# Patient Record
Sex: MALE | Race: WHITE | NOT HISPANIC OR LATINO | URBAN - METROPOLITAN AREA
[De-identification: names, ages, dates, MRNs, and addresses within clinical notes are randomized per-mention and may not be internally consistent; named-entity substitution may affect disease eponyms.]

---

## 2021-12-24 ENCOUNTER — INPATIENT (INPATIENT)
Facility: HOSPITAL | Age: 56
LOS: 12 days | Discharge: HOME | DRG: 177 | End: 2022-01-06
Attending: HOSPITALIST | Admitting: INTERNAL MEDICINE
Payer: COMMERCIAL

## 2021-12-24 VITALS
HEIGHT: 70 IN | RESPIRATION RATE: 28 BRPM | TEMPERATURE: 100 F | HEART RATE: 112 BPM | DIASTOLIC BLOOD PRESSURE: 77 MMHG | OXYGEN SATURATION: 93 % | SYSTOLIC BLOOD PRESSURE: 121 MMHG

## 2021-12-24 LAB
ALBUMIN SERPL ELPH-MCNC: 3.8 G/DL — SIGNIFICANT CHANGE UP (ref 3.3–5.2)
ALP SERPL-CCNC: 79 U/L — SIGNIFICANT CHANGE UP (ref 40–120)
ALT FLD-CCNC: 74 U/L — HIGH
ANION GAP SERPL CALC-SCNC: 11 MMOL/L — SIGNIFICANT CHANGE UP (ref 5–17)
APTT BLD: 33.8 SEC — SIGNIFICANT CHANGE UP (ref 27.5–35.5)
AST SERPL-CCNC: 178 U/L — HIGH
BASE EXCESS BLDV CALC-SCNC: 5.1 MMOL/L — HIGH (ref -2–3)
BASOPHILS # BLD AUTO: 0.05 K/UL — SIGNIFICANT CHANGE UP (ref 0–0.2)
BASOPHILS NFR BLD AUTO: 0.3 % — SIGNIFICANT CHANGE UP (ref 0–2)
BILIRUB SERPL-MCNC: 0.4 MG/DL — SIGNIFICANT CHANGE UP (ref 0.4–2)
BUN SERPL-MCNC: 3.6 MG/DL — LOW (ref 8–20)
CA-I SERPL-SCNC: 1.13 MMOL/L — LOW (ref 1.15–1.33)
CALCIUM SERPL-MCNC: 8.5 MG/DL — LOW (ref 8.6–10.2)
CHLORIDE BLDV-SCNC: 90 MMOL/L — LOW (ref 98–107)
CHLORIDE SERPL-SCNC: 90 MMOL/L — LOW (ref 98–107)
CO2 SERPL-SCNC: 26 MMOL/L — SIGNIFICANT CHANGE UP (ref 22–29)
CREAT SERPL-MCNC: 0.41 MG/DL — LOW (ref 0.5–1.3)
EOSINOPHIL # BLD AUTO: 0.06 K/UL — SIGNIFICANT CHANGE UP (ref 0–0.5)
EOSINOPHIL NFR BLD AUTO: 0.4 % — SIGNIFICANT CHANGE UP (ref 0–6)
GAS PNL BLDV: 126 MMOL/L — LOW (ref 136–145)
GAS PNL BLDV: SIGNIFICANT CHANGE UP
GLUCOSE BLDV-MCNC: 169 MG/DL — HIGH (ref 70–99)
GLUCOSE SERPL-MCNC: 164 MG/DL — HIGH (ref 70–99)
HCO3 BLDV-SCNC: 30 MMOL/L — HIGH (ref 22–29)
HCT VFR BLD CALC: 33.3 % — LOW (ref 39–50)
HCT VFR BLDA CALC: 35 % — LOW (ref 39–51)
HGB BLD CALC-MCNC: 11.7 G/DL — LOW (ref 12.6–17.4)
HGB BLD-MCNC: 11.4 G/DL — LOW (ref 13–17)
HIV 1 & 2 AB SERPL IA.RAPID: SIGNIFICANT CHANGE UP
IMM GRANULOCYTES NFR BLD AUTO: 0.5 % — SIGNIFICANT CHANGE UP (ref 0–1.5)
INR BLD: 1.19 RATIO — HIGH (ref 0.88–1.16)
LACTATE BLDV-MCNC: 1.1 MMOL/L — SIGNIFICANT CHANGE UP (ref 0.5–2)
LYMPHOCYTES # BLD AUTO: 0.84 K/UL — LOW (ref 1–3.3)
LYMPHOCYTES # BLD AUTO: 4.9 % — LOW (ref 13–44)
MCHC RBC-ENTMCNC: 28.8 PG — SIGNIFICANT CHANGE UP (ref 27–34)
MCHC RBC-ENTMCNC: 34.2 GM/DL — SIGNIFICANT CHANGE UP (ref 32–36)
MCV RBC AUTO: 84.1 FL — SIGNIFICANT CHANGE UP (ref 80–100)
MONOCYTES # BLD AUTO: 1.3 K/UL — HIGH (ref 0–0.9)
MONOCYTES NFR BLD AUTO: 7.6 % — SIGNIFICANT CHANGE UP (ref 2–14)
NEUTROPHILS # BLD AUTO: 14.73 K/UL — HIGH (ref 1.8–7.4)
NEUTROPHILS NFR BLD AUTO: 86.3 % — HIGH (ref 43–77)
NT-PROBNP SERPL-SCNC: 87 PG/ML — SIGNIFICANT CHANGE UP (ref 0–300)
PCO2 BLDV: 51 MMHG — SIGNIFICANT CHANGE UP (ref 42–55)
PH BLDV: 7.38 — SIGNIFICANT CHANGE UP (ref 7.32–7.43)
PLATELET # BLD AUTO: 208 K/UL — SIGNIFICANT CHANGE UP (ref 150–400)
PO2 BLDV: 93 MMHG — HIGH (ref 25–45)
POTASSIUM BLDV-SCNC: 3.1 MMOL/L — LOW (ref 3.5–5.1)
POTASSIUM SERPL-MCNC: 3.2 MMOL/L — LOW (ref 3.5–5.3)
POTASSIUM SERPL-SCNC: 3.2 MMOL/L — LOW (ref 3.5–5.3)
PROT SERPL-MCNC: 6.7 G/DL — SIGNIFICANT CHANGE UP (ref 6.6–8.7)
PROTHROM AB SERPL-ACNC: 13.7 SEC — HIGH (ref 10.6–13.6)
RBC # BLD: 3.96 M/UL — LOW (ref 4.2–5.8)
RBC # FLD: 13.8 % — SIGNIFICANT CHANGE UP (ref 10.3–14.5)
SAO2 % BLDV: 98.8 % — SIGNIFICANT CHANGE UP
SODIUM SERPL-SCNC: 127 MMOL/L — LOW (ref 135–145)
WBC # BLD: 17.07 K/UL — HIGH (ref 3.8–10.5)
WBC # FLD AUTO: 17.07 K/UL — HIGH (ref 3.8–10.5)

## 2021-12-24 PROCEDURE — 71045 X-RAY EXAM CHEST 1 VIEW: CPT | Mod: 26

## 2021-12-24 PROCEDURE — 99285 EMERGENCY DEPT VISIT HI MDM: CPT

## 2021-12-24 RX ORDER — POTASSIUM CHLORIDE 20 MEQ
40 PACKET (EA) ORAL ONCE
Refills: 0 | Status: COMPLETED | OUTPATIENT
Start: 2021-12-24 | End: 2021-12-24

## 2021-12-24 RX ORDER — FUROSEMIDE 40 MG
40 TABLET ORAL ONCE
Refills: 0 | Status: COMPLETED | OUTPATIENT
Start: 2021-12-24 | End: 2021-12-24

## 2021-12-24 RX ORDER — ACETAMINOPHEN 500 MG
650 TABLET ORAL ONCE
Refills: 0 | Status: COMPLETED | OUTPATIENT
Start: 2021-12-24 | End: 2021-12-24

## 2021-12-24 RX ADMIN — Medication 650 MILLIGRAM(S): at 22:44

## 2021-12-24 RX ADMIN — Medication 40 MILLIGRAM(S): at 23:56

## 2021-12-24 NOTE — ED PROVIDER NOTE - PHYSICAL EXAMINATION
General: chronically ill appearing man on non-rebreather, in no acute respiratory distress  Head: normocephalic, atraumatic  Eyes: PERRL   Mouth: moist mucous membranes  Neck: supple neck, no lymphadenopathy  CV: tachycardic, regular rhythm , no LE edema, peripheral pulses 2+ bilateral UE and LE, + bilateral lower extremity edema   Respiratory: stating 93% on 15L non-rebreather crackles bilateral   Abdomen: soft, nontender, nondistended  : no suprapubic tenderness, no CVAT  MSK: no joint deformities  Neuro: alert and oriented x3, speech clear, moving all extremities spontaneously without difficulty  Skin: 2 cm wound on medial ankle with minimal surrounding erythema, no purulent, drainage or streaking redness. General: chronically ill appearing man on non-rebreather, in no acute respiratory distress  Head: normocephalic, atraumatic  Eyes: PERRL   Mouth: moist mucous membranes  Neck: supple neck, no lymphadenopathy  CV: tachycardic, regular rhythm, peripheral pulses 2+ bilateral UE and LE, + bilateral lower extremity edema   Respiratory: sp02 93% on 15L non-rebreather crackles bilateral   Abdomen: soft, nontender, nondistended  : no suprapubic tenderness, no CVAT  MSK: no joint deformities  Neuro: alert and oriented x3, speech clear, moving all extremities spontaneously without difficulty  Skin: 2 cm wound on medial ankle with minimal surrounding erythema, no purulent, drainage or streaking redness.

## 2021-12-24 NOTE — ED PROVIDER NOTE - OBJECTIVE STATEMENT
57 y/o male with PMHx of schizophrenia  BIBEMS from [place] c/o SOB and wound to left foot, Pt states wound on foot started yesterday. Pt states he is always SOB. Pt endorses cough. Pt denies chest pain, leg pain. Pt is poor historian and is unable to contribute further hx secondary to psychiatric illness. 55 y/o male with PMHx of schizophrenia, HTN, DM, BIBEMS from [facility] c/o SOB and wound to right foot, Pt states wound on foot started yesterday. Pt states he is always SOB. Pt endorses cough. Pt denies chest pain, leg pain. Pt is poor historian and is unable to contribute further hx secondary to psychiatric illness.

## 2021-12-24 NOTE — ED PROVIDER NOTE - PROGRESS NOTE DETAILS
MD Yuval: spoke with RONALD Tam at [facility] who was sent over for evaluation of wound on right ankle, unclear how long it has been there. They also noted that he was more lethargic and had more slurred speech than usual. He also seemed like he had more difficulty breathing but unclear if it is his baseline as he does not usually take care of him. MD Yuval: CT reveals lesion concerning for malignancy, discussed results with pt who is able to verbalize diagnosis but unclear if pt has clear understanding. pt also noted to have changes consistent with infection/pna and started on abx. will admit for further evaluation of nodule and respiratory support for hypoxia in setting of pna.

## 2021-12-24 NOTE — ED ADULT TRIAGE NOTE - SOURCE OF INFORMATION
Last Labs:  5/22/2017  Last OV:  5/24/2017  Last RF:  5/24/2017    Future Appointments  Date Time Provider Kamran Mcdonnell   8/24/2017 8:15 AM REF [facility] Ref [facility]c     Nevaeh Love, 08/21/17, 7:02 AM
Patient

## 2021-12-24 NOTE — ED PROVIDER NOTE - NS ED ROS FT
General: no fevers  Head: no headaches  Eyes: no vision change  ENT: no nasal discharge/congestion, no sore throat, no ear pain  CV: no chest pain  Resp: + SOB, + cough  GI: no N/V/D, no abdominal pain, no blood in stool  : no dysuria, no hematuria   MSK: no joint pain, no muscle aches, no neck pain or back pain  Skin:  + wound to left foot  Neuro: no focal weakness, no change in sensation General: no fever  Head: no headaches  Eyes: no vision change  ENT: no nasal discharge/congestion  CV: no chest pain  Resp: + chronic SOB, + cough  GI: no N/V/D, no abdominal pain, no blood in stool  : no dysuria  MSK: no joint pain  Skin:  + wound to left foot  Neuro: no focal weakness, no change in sensation

## 2021-12-24 NOTE — ED ADULT NURSE NOTE - OBJECTIVE STATEMENT
Pt BIBA for SOB and lesions on R foot. Pt's speech is incoherent. Pt presents SOB and wheezing. R foot has lesions by medial ankle; unknown when lesions started. Pt moved to PEDS 1 to R/O COVID. Pt placed on telemonitor with SpO2 monitoring. IV access obtained. Specimens sent to lab. Will continue to monitor. Pt BIBA for SOB and lesions on R foot. Pt's speech is incoherent. Pt presents SOB and wheezing. R foot has lesions by medial ankle; unknown when lesions started. Pt moved to PEDS 1 to R/O COVID.  Pt placed on monitor with SpO2 monitoring. IV access obtained. Specimens sent to lab. Will continue to monitor.

## 2021-12-24 NOTE — ED ADULT TRIAGE NOTE - CHIEF COMPLAINT QUOTE
BIBEMS from [facility] for SOB and infection to R foot. Vaccinated. Denies sick contacts, borderline febrile. R foot is red, swollen, dry. Patient is a poor historian.

## 2021-12-24 NOTE — ED PROVIDER NOTE - CLINICAL SUMMARY MEDICAL DECISION MAKING FREE TEXT BOX
57yo man presents and provides poor hx and complains of chronic SOB and right ankle injury that he states he had since yesterday. No fevers but has low grade oral temp. Will consider PNA vs PE vs CHF vs acs vs viral infx. Will obtain blood work, CTA chest, CTH, cxr. Will require admission for respiratory support. 57yo man presents and provides poor hx and complains of chronic SOB and right ankle injury that he states he had since yesterday. No fevers but has low grade oral temp. No cellulitic changes around ankle wound. Will consider PNA vs PE vs CHF vs acs vs viral infx. Will obtain blood work, CTA chest, CTH, cxr. Will require admission for respiratory support.

## 2021-12-24 NOTE — ED ADULT NURSE NOTE - CHIEF COMPLAINT QUOTE
BIBEMS from [facility] for SOB and infection to R foot. Vaccinated. Denies sick contacts, borderline febrile. R foot is red, swollen, dry. Patient is a poor historian.

## 2021-12-25 DIAGNOSIS — J18.9 PNEUMONIA, UNSPECIFIED ORGANISM: ICD-10-CM

## 2021-12-25 LAB
ANION GAP SERPL CALC-SCNC: 12 MMOL/L — SIGNIFICANT CHANGE UP (ref 5–17)
APPEARANCE UR: CLEAR — SIGNIFICANT CHANGE UP
BILIRUB UR-MCNC: NEGATIVE — SIGNIFICANT CHANGE UP
BUN SERPL-MCNC: <3 MG/DL — LOW (ref 8–20)
CALCIUM SERPL-MCNC: 9.1 MG/DL — SIGNIFICANT CHANGE UP (ref 8.6–10.2)
CHLORIDE SERPL-SCNC: 94 MMOL/L — LOW (ref 98–107)
CO2 SERPL-SCNC: 29 MMOL/L — SIGNIFICANT CHANGE UP (ref 22–29)
COLOR SPEC: YELLOW — SIGNIFICANT CHANGE UP
CREAT SERPL-MCNC: 0.41 MG/DL — LOW (ref 0.5–1.3)
DIFF PNL FLD: NEGATIVE — SIGNIFICANT CHANGE UP
GLUCOSE SERPL-MCNC: 168 MG/DL — HIGH (ref 70–99)
GLUCOSE UR QL: 50 MG/DL
KETONES UR-MCNC: ABNORMAL
LEUKOCYTE ESTERASE UR-ACNC: NEGATIVE — SIGNIFICANT CHANGE UP
NITRITE UR-MCNC: NEGATIVE — SIGNIFICANT CHANGE UP
PH UR: 6.5 — SIGNIFICANT CHANGE UP (ref 5–8)
POTASSIUM SERPL-MCNC: 3.4 MMOL/L — LOW (ref 3.5–5.3)
POTASSIUM SERPL-SCNC: 3.4 MMOL/L — LOW (ref 3.5–5.3)
PROT UR-MCNC: NEGATIVE — SIGNIFICANT CHANGE UP
RAPID RVP RESULT: SIGNIFICANT CHANGE UP
SARS-COV-2 RNA SPEC QL NAA+PROBE: SIGNIFICANT CHANGE UP
SODIUM SERPL-SCNC: 134 MMOL/L — LOW (ref 135–145)
SP GR SPEC: 1.01 — SIGNIFICANT CHANGE UP (ref 1.01–1.02)
TROPONIN T SERPL-MCNC: <0.01 NG/ML — SIGNIFICANT CHANGE UP (ref 0–0.06)
UROBILINOGEN FLD QL: 1 MG/DL

## 2021-12-25 PROCEDURE — 99223 1ST HOSP IP/OBS HIGH 75: CPT

## 2021-12-25 PROCEDURE — 70450 CT HEAD/BRAIN W/O DYE: CPT | Mod: 26,MA

## 2021-12-25 PROCEDURE — 71275 CT ANGIOGRAPHY CHEST: CPT | Mod: 26,MA

## 2021-12-25 RX ORDER — INSULIN DETEMIR 100/ML (3)
30 INSULIN PEN (ML) SUBCUTANEOUS
Qty: 0 | Refills: 0 | DISCHARGE

## 2021-12-25 RX ORDER — POTASSIUM CHLORIDE 20 MEQ
40 PACKET (EA) ORAL EVERY 4 HOURS
Refills: 0 | Status: COMPLETED | OUTPATIENT
Start: 2021-12-25 | End: 2021-12-25

## 2021-12-25 RX ORDER — CLOZAPINE 150 MG/1
25 TABLET, ORALLY DISINTEGRATING ORAL AT BEDTIME
Refills: 0 | Status: DISCONTINUED | OUTPATIENT
Start: 2021-12-25 | End: 2022-01-06

## 2021-12-25 RX ORDER — LANOLIN ALCOHOL/MO/W.PET/CERES
3 CREAM (GRAM) TOPICAL AT BEDTIME
Refills: 0 | Status: DISCONTINUED | OUTPATIENT
Start: 2021-12-25 | End: 2022-01-06

## 2021-12-25 RX ORDER — LOSARTAN POTASSIUM 100 MG/1
1 TABLET, FILM COATED ORAL
Qty: 0 | Refills: 0 | DISCHARGE

## 2021-12-25 RX ORDER — LOSARTAN POTASSIUM 100 MG/1
100 TABLET, FILM COATED ORAL AT BEDTIME
Refills: 0 | Status: DISCONTINUED | OUTPATIENT
Start: 2021-12-25 | End: 2022-01-06

## 2021-12-25 RX ORDER — VALPROIC ACID (AS SODIUM SALT) 250 MG/5ML
250 SOLUTION, ORAL ORAL AT BEDTIME
Refills: 0 | Status: DISCONTINUED | OUTPATIENT
Start: 2021-12-25 | End: 2021-12-28

## 2021-12-25 RX ORDER — CEFTRIAXONE 500 MG/1
1000 INJECTION, POWDER, FOR SOLUTION INTRAMUSCULAR; INTRAVENOUS EVERY 24 HOURS
Refills: 0 | Status: DISCONTINUED | OUTPATIENT
Start: 2021-12-25 | End: 2021-12-26

## 2021-12-25 RX ORDER — HALOPERIDOL DECANOATE 100 MG/ML
100 INJECTION INTRAMUSCULAR
Qty: 0 | Refills: 0 | DISCHARGE

## 2021-12-25 RX ORDER — POTASSIUM CHLORIDE 20 MEQ
40 PACKET (EA) ORAL EVERY 4 HOURS
Refills: 0 | Status: DISCONTINUED | OUTPATIENT
Start: 2021-12-25 | End: 2021-12-25

## 2021-12-25 RX ORDER — SODIUM CHLORIDE 9 MG/ML
1000 INJECTION, SOLUTION INTRAVENOUS
Refills: 0 | Status: DISCONTINUED | OUTPATIENT
Start: 2021-12-25 | End: 2021-12-26

## 2021-12-25 RX ORDER — CLOZAPINE 150 MG/1
1 TABLET, ORALLY DISINTEGRATING ORAL
Qty: 0 | Refills: 0 | DISCHARGE

## 2021-12-25 RX ORDER — ENOXAPARIN SODIUM 100 MG/ML
40 INJECTION SUBCUTANEOUS DAILY
Refills: 0 | Status: DISCONTINUED | OUTPATIENT
Start: 2021-12-25 | End: 2022-01-02

## 2021-12-25 RX ORDER — IPRATROPIUM/ALBUTEROL SULFATE 18-103MCG
2 AEROSOL WITH ADAPTER (GRAM) INHALATION
Qty: 0 | Refills: 0 | DISCHARGE

## 2021-12-25 RX ORDER — VALPROIC ACID (AS SODIUM SALT) 250 MG/5ML
20 SOLUTION, ORAL ORAL
Qty: 0 | Refills: 0 | DISCHARGE

## 2021-12-25 RX ORDER — AZITHROMYCIN 500 MG/1
500 TABLET, FILM COATED ORAL ONCE
Refills: 0 | Status: COMPLETED | OUTPATIENT
Start: 2021-12-25 | End: 2021-12-25

## 2021-12-25 RX ORDER — CLOZAPINE 150 MG/1
200 TABLET, ORALLY DISINTEGRATING ORAL AT BEDTIME
Refills: 0 | Status: DISCONTINUED | OUTPATIENT
Start: 2021-12-25 | End: 2022-01-06

## 2021-12-25 RX ORDER — IPRATROPIUM/ALBUTEROL SULFATE 18-103MCG
2 AEROSOL WITH ADAPTER (GRAM) INHALATION
Refills: 0 | Status: DISCONTINUED | OUTPATIENT
Start: 2021-12-25 | End: 2021-12-29

## 2021-12-25 RX ORDER — AZITHROMYCIN 500 MG/1
500 TABLET, FILM COATED ORAL DAILY
Refills: 0 | Status: DISCONTINUED | OUTPATIENT
Start: 2021-12-25 | End: 2021-12-26

## 2021-12-25 RX ORDER — CEFTRIAXONE 500 MG/1
1000 INJECTION, POWDER, FOR SOLUTION INTRAMUSCULAR; INTRAVENOUS ONCE
Refills: 0 | Status: COMPLETED | OUTPATIENT
Start: 2021-12-25 | End: 2021-12-25

## 2021-12-25 RX ORDER — HALOPERIDOL DECANOATE 100 MG/ML
5 INJECTION INTRAMUSCULAR ONCE
Refills: 0 | Status: COMPLETED | OUTPATIENT
Start: 2021-12-25 | End: 2021-12-25

## 2021-12-25 RX ADMIN — HALOPERIDOL DECANOATE 5 MILLIGRAM(S): 100 INJECTION INTRAMUSCULAR at 04:20

## 2021-12-25 RX ADMIN — CEFTRIAXONE 100 MILLIGRAM(S): 500 INJECTION, POWDER, FOR SOLUTION INTRAMUSCULAR; INTRAVENOUS at 06:27

## 2021-12-25 RX ADMIN — Medication 2 MILLIGRAM(S): at 04:20

## 2021-12-25 RX ADMIN — CLOZAPINE 25 MILLIGRAM(S): 150 TABLET, ORALLY DISINTEGRATING ORAL at 21:35

## 2021-12-25 RX ADMIN — CLOZAPINE 200 MILLIGRAM(S): 150 TABLET, ORALLY DISINTEGRATING ORAL at 21:36

## 2021-12-25 RX ADMIN — Medication 650 MILLIGRAM(S): at 03:15

## 2021-12-25 NOTE — ED ADULT NURSE REASSESSMENT NOTE - NS ED NURSE REASSESS COMMENT FT1
Patient endorsed from RONALD Ahn. Care resumed at this time. Patient endorsed from RONALD Ahn. RN endorsed patient pulled out IV line. Ordered rocephin was not completed. Care resumed at this time. Patient endorsed from RN Shivam Ahn. RN endorsed patient pulled out IV line. Ordered rocephin was not completed as per RN. Patient refusing to be placed back on monitor and a new med lock. MD made aware. Care resumed at this time.

## 2021-12-25 NOTE — ED ADULT NURSE REASSESSMENT NOTE - NS ED NURSE REASSESS COMMENT FT1
Pt refusing BP and temperature; Dr. Pimentel made aware of vital signs. Bed locked and in lowest position. Will continue to monitor.

## 2021-12-25 NOTE — H&P ADULT - HISTORY OF PRESENT ILLNESS
56 yr old male from [facility] on the [place] [facility] with medical history of Schizo, DM, Obesity, HTN was sent in for right foot wound, which he states was noted yesterday. He does not recall trauma. However in ED, the wound appears > 1 day old and incidentally noted pulm infiltrates and nodule on imaging. Denies fevers, dyspnea, cough. States his wheezing/ congestion is regular and he takes inhalers.     SH- per records, no habits  FH- none available

## 2021-12-25 NOTE — H&P ADULT - NSHPPHYSICALEXAM_GEN_ALL_CORE
Vital Signs Last 24 Hrs  T(C): 37.1 (12-25-21 @ 06:31), Max: 37.9 (12-24-21 @ 21:18)  T(F): 98.8 (12-25-21 @ 06:31), Max: 100.2 (12-24-21 @ 21:18)  HR: 91 (12-25-21 @ 06:31) (91 - 112)  BP: 159/74 (12-25-21 @ 06:31) (121/77 - 159/74)  BP(mean): --  RR: 28 (12-25-21 @ 06:31) (28 - 28)  SpO2: 93% (12-25-21 @ 06:31) (92% - 93%)    GENERAL: Morbid Obesity, sitting in chair, with congested cough +, no dyspnea  HEAD:  Atraumatic, Normocephalic  EYES: conjunctiva and sclera clear  NECK: Supple, No JVD, Normal thyroid  NERVOUS SYSTEM:  Alert & Oriented X 3, Motor Strength 5/5 B/L upper and lower extremities  CHEST/LUNG: CTA bilaterally; No rales, rhonchi, wheezing, or rubs  HEART: Regular rate and rhythm; No murmurs, rubs, or gallops  ABDOMEN: Obese, Soft, Nontender, Nondistended; Bowel sounds present  EXTREMITIES:  2+ Peripheral Pulses, No clubbing, cyanosis, or edema  RIGHT FOOT- inner ankle- 2 areas of abrasion with healing scab formation, minimal edema surrounding, no erythema, no tenderness, no discharge

## 2021-12-25 NOTE — H&P ADULT - ASSESSMENT
56 yr old male from [facility] on the [place] [facility] with medical history of Schizo, DM, Obesity, HTN was sent in for right foot wound, with incidental finding of PNA    # GGOs, tree in the bud opacities on imaging= CAP  possible post obstructive  Blood c/s sent from ED  Rocephin, Zithromax  If no improvement will escalate  Cont maintenance Combivent inhaler  Mucinex  COVID negative, vaccinated    # Acute hypoxic respiratory failure  requiring 6 L NC  unable to tolerate NRB  etio- sec to above    # 3.9 x 2.8 cm nodular right upper lobe opacity on imaging= highly suspicious for malignancy  Need further details about chronicity, acceptance of medical intervention  SW consult requested to get info from his facility about NOK, HCP etc     # Schizophrenia  requiring repeated redirection/ reorientation  keeps wandering pulling off O2  Cont Valproate/ Clozaril    # DM2 uncontrolled  high HgbA1c  Lantus, premeal, ISS here    # HTN controlled  Cont Cozaar    # Right foot wound  with healing scab beginning to form  wound care ordered    Lovenox       56 yr old male from [facility] on the [place] [facility] with medical history of Schizo, DM, Obesity, HTN was sent in for right foot wound, with incidental finding of PNA    # GGOs, tree in the bud opacities on imaging, leucocytosis= CAP  possible post obstructive  Blood c/s sent from ED  Rocephin, Zithromax  If no improvement will escalate  Cont maintenance Combivent inhaler  Mucinex  COVID negative, vaccinated    # Acute hypoxic respiratory failure  requiring 6 L NC  unable to tolerate NRB  etio- sec to above    # 3.9 x 2.8 cm nodular right upper lobe opacity on imaging= highly suspicious for malignancy  Need further details about chronicity, acceptance of medical intervention  SW consult requested to get info from his facility about NOK, HCP etc     # Hyponatremia  likely chronic sec to psych meds  no dietary salt restrictions  if persists or worsens will add fluid restriction    # Hypokalemia  replete    # UA with ketones= dehydration  24 hr IVF    # Schizophrenia  requiring repeated redirection/ reorientation  keeps wandering pulling off O2  Cont Valproate/ Clozaril    # DM2 uncontrolled  high HgbA1c  Lantus, premeal, ISS here    # HTN controlled  Cont Cozaar    # Right foot wound  with healing scab beginning to form  wound care ordered    Lovenox

## 2021-12-25 NOTE — ED ADULT NURSE REASSESSMENT NOTE - NS ED NURSE REASSESS COMMENT FT1
Pt placed back on telemonitor. IV antibiotics running. Bed locked and in lowest position. Frequent checks made. Will continue to monitor.

## 2021-12-26 LAB
ALBUMIN SERPL ELPH-MCNC: 3.9 G/DL — SIGNIFICANT CHANGE UP (ref 3.3–5.2)
ALP SERPL-CCNC: 84 U/L — SIGNIFICANT CHANGE UP (ref 40–120)
ALT FLD-CCNC: 75 U/L — HIGH
ANION GAP SERPL CALC-SCNC: 11 MMOL/L — SIGNIFICANT CHANGE UP (ref 5–17)
AST SERPL-CCNC: 106 U/L — HIGH
BASOPHILS # BLD AUTO: 0.06 K/UL — SIGNIFICANT CHANGE UP (ref 0–0.2)
BASOPHILS NFR BLD AUTO: 0.5 % — SIGNIFICANT CHANGE UP (ref 0–2)
BILIRUB SERPL-MCNC: 0.5 MG/DL — SIGNIFICANT CHANGE UP (ref 0.4–2)
BUN SERPL-MCNC: 4.5 MG/DL — LOW (ref 8–20)
CALCIUM SERPL-MCNC: 9 MG/DL — SIGNIFICANT CHANGE UP (ref 8.6–10.2)
CHLORIDE SERPL-SCNC: 95 MMOL/L — LOW (ref 98–107)
CO2 SERPL-SCNC: 30 MMOL/L — HIGH (ref 22–29)
CREAT SERPL-MCNC: 0.5 MG/DL — SIGNIFICANT CHANGE UP (ref 0.5–1.3)
CULTURE RESULTS: SIGNIFICANT CHANGE UP
EOSINOPHIL # BLD AUTO: 0.21 K/UL — SIGNIFICANT CHANGE UP (ref 0–0.5)
EOSINOPHIL NFR BLD AUTO: 1.8 % — SIGNIFICANT CHANGE UP (ref 0–6)
GLUCOSE SERPL-MCNC: 129 MG/DL — HIGH (ref 70–99)
HCT VFR BLD CALC: 35.3 % — LOW (ref 39–50)
HGB BLD-MCNC: 11.6 G/DL — LOW (ref 13–17)
IMM GRANULOCYTES NFR BLD AUTO: 0.3 % — SIGNIFICANT CHANGE UP (ref 0–1.5)
LYMPHOCYTES # BLD AUTO: 1.64 K/UL — SIGNIFICANT CHANGE UP (ref 1–3.3)
LYMPHOCYTES # BLD AUTO: 13.9 % — SIGNIFICANT CHANGE UP (ref 13–44)
MAGNESIUM SERPL-MCNC: 2.2 MG/DL — SIGNIFICANT CHANGE UP (ref 1.6–2.6)
MCHC RBC-ENTMCNC: 28.1 PG — SIGNIFICANT CHANGE UP (ref 27–34)
MCHC RBC-ENTMCNC: 32.9 GM/DL — SIGNIFICANT CHANGE UP (ref 32–36)
MCV RBC AUTO: 85.5 FL — SIGNIFICANT CHANGE UP (ref 80–100)
MONOCYTES # BLD AUTO: 1.15 K/UL — HIGH (ref 0–0.9)
MONOCYTES NFR BLD AUTO: 9.8 % — SIGNIFICANT CHANGE UP (ref 2–14)
NEUTROPHILS # BLD AUTO: 8.69 K/UL — HIGH (ref 1.8–7.4)
NEUTROPHILS NFR BLD AUTO: 73.7 % — SIGNIFICANT CHANGE UP (ref 43–77)
PLATELET # BLD AUTO: 228 K/UL — SIGNIFICANT CHANGE UP (ref 150–400)
POTASSIUM SERPL-MCNC: 3.3 MMOL/L — LOW (ref 3.5–5.3)
POTASSIUM SERPL-SCNC: 3.3 MMOL/L — LOW (ref 3.5–5.3)
PROT SERPL-MCNC: 7.2 G/DL — SIGNIFICANT CHANGE UP (ref 6.6–8.7)
RBC # BLD: 4.13 M/UL — LOW (ref 4.2–5.8)
RBC # FLD: 14 % — SIGNIFICANT CHANGE UP (ref 10.3–14.5)
SODIUM SERPL-SCNC: 136 MMOL/L — SIGNIFICANT CHANGE UP (ref 135–145)
SPECIMEN SOURCE: SIGNIFICANT CHANGE UP
VALPROATE SERPL-MCNC: 14.2 UG/ML — LOW (ref 50–100)
WBC # BLD: 11.79 K/UL — HIGH (ref 3.8–10.5)
WBC # FLD AUTO: 11.79 K/UL — HIGH (ref 3.8–10.5)

## 2021-12-26 PROCEDURE — 99233 SBSQ HOSP IP/OBS HIGH 50: CPT

## 2021-12-26 RX ORDER — POTASSIUM CHLORIDE 20 MEQ
10 PACKET (EA) ORAL ONCE
Refills: 0 | Status: DISCONTINUED | OUTPATIENT
Start: 2021-12-26 | End: 2021-12-26

## 2021-12-26 RX ORDER — POTASSIUM CHLORIDE 20 MEQ
40 PACKET (EA) ORAL ONCE
Refills: 0 | Status: COMPLETED | OUTPATIENT
Start: 2021-12-26 | End: 2021-12-26

## 2021-12-26 RX ADMIN — Medication 600 MILLIGRAM(S): at 17:08

## 2021-12-26 RX ADMIN — CLOZAPINE 25 MILLIGRAM(S): 150 TABLET, ORALLY DISINTEGRATING ORAL at 21:54

## 2021-12-26 RX ADMIN — Medication 2 PUFF(S): at 08:00

## 2021-12-26 RX ADMIN — LOSARTAN POTASSIUM 100 MILLIGRAM(S): 100 TABLET, FILM COATED ORAL at 21:54

## 2021-12-26 RX ADMIN — CLOZAPINE 200 MILLIGRAM(S): 150 TABLET, ORALLY DISINTEGRATING ORAL at 21:54

## 2021-12-26 NOTE — PROGRESS NOTE ADULT - ASSESSMENT
56 yr old [facility] from [facility] [place] [facility] with medical history of Schizo, DM, Obesity, HTN was sent in for right foot wound, with incidental finding of PNA    # GGOs, tree in the bud opacities on imaging, leucocytosis= CAP  possible post obstructive  Blood c/s sent from ED  refusing IV placement, will change abx to Levaquin   If no improvement will escalate, will consult ID  Cont maintenance Combivent inhaler  Mucinex  COVID negative, vaccinated    # Acute hypoxic respiratory failure  -Not tolerating NC, occasionally ripping them off  -etio- sec to above    # 3.9 x 2.8 cm nodular right upper lobe opacity on imaging= highly suspicious for malignancy  -Need further details about chronicity, acceptance of medical intervention  -SW consult requested to get info from his facility about NOK, HCP etc   -If no HCP, will obtain ethics consult    # Hyponatremia  -likely chronic sec to psych meds, improving after IVF  -daily BMP    # Schizophrenia  -keeps wandering pulling off O2  -Cont Valproate/ Clozaril  -psych consulted, emilio recs    # DM2 uncontrolled  -high HgbA1c  -Lantus, premeal, ISS here    # HTN controlled  Cont Cozaar    # Right foot wound  -with healing scab beginning to form  -wound care consulted, emilio recs    Lovenox    Dispo: pending wound care, weaning off NC and PNA treatment    Social work eval pending for establishing of HCP

## 2021-12-26 NOTE — PROGRESS NOTE ADULT - SUBJECTIVE AND OBJECTIVE BOX
[facility] Division of Hospital Medicine Progress Note    CONTACT INFO:  Garland Pavon M.D.  [phone]    Patient is a 56y old  Male who presents with a chief complaint of foot wound (25 Dec 2021 11:59)      SUBJECTIVE / OVERNIGHT EVENTS: Reports feels fine. Ripped out his IV and refuse IV placement for now. Patient denies chest pain, SOB, abd pain, N/V, fever, chills, dysuria or any other complaints. All remainder ROS negative.     MEDICATIONS  (STANDING):  albuterol/ipratropium (CFC free) Inhaler. 2 Puff(s) Inhalation four times a day  cloZAPine 200 milliGRAM(s) Oral at bedtime  cloZAPine 25 milliGRAM(s) Oral at bedtime  enoxaparin Injectable 40 milliGRAM(s) SubCutaneous daily  guaiFENesin  milliGRAM(s) Oral every 12 hours  lactated ringers. 1000 milliLiter(s) (100 mL/Hr) IV Continuous <Continuous>  levoFLOXacin  Tablet 750 milliGRAM(s) Oral every 24 hours  losartan 100 milliGRAM(s) Oral at bedtime  valproic  acid Syrup 250 milliGRAM(s) Oral at bedtime    MEDICATIONS  (PRN):  melatonin 3 milliGRAM(s) Oral at bedtime PRN Sleep      I&O's Summary      PHYSICAL EXAM:  Vital Signs Last 24 Hrs  T(C): 37.1 (26 Dec 2021 07:39), Max: 37.1 (25 Dec 2021 13:17)  T(F): 98.8 (26 Dec 2021 07:39), Max: 98.8 (25 Dec 2021 19:00)  HR: 97 (26 Dec 2021 07:39) (91 - 98)  BP: 137/81 (26 Dec 2021 07:39) (109/70 - 147/91)  BP(mean): --  RR: 28 (26 Dec 2021 07:39) (28 - 29)  SpO2: 94% (26 Dec 2021 07:39) (92% - 99%)      GENERAL: Morbid Obesity, sitting in bed, no NC  HEAD:  Atraumatic, Normocephalic  EYES: conjunctiva and sclera clear  NECK: Supple, No JVD, Normal thyroid  NERVOUS SYSTEM: Motor Strength 5/5 B/L upper and lower extremities  CHEST/LUNG: CTA bilaterally; No rales, rhonchi, wheezing, or rubs  HEART: Regular rate and rhythm; No murmurs, rubs, or gallops. + LE edema b/l  ABDOMEN: Obese, Soft, Nontender, Nondistended; Bowel sounds present  EXTREMITIES:  2+ Peripheral Pulses, No clubbing, cyanosis, or edema  RIGHT FOOT- inner ankle- 2 areas of abrasion with healing scab formation, minimal erythema, no tenderness, no discharge    LABS:                        11.4   17.07 )-----------( 208      ( 24 Dec 2021 22:33 )             33.3     12-    134<L>  |  94<L>  |  <3.0<L>  ----------------------------<  168<H>  3.4<L>   |  29.0  |  0.41<L>    Ca    9.1      25 Dec 2021 05:08    TPro  6.7  /  Alb  3.8  /  TBili  0.4  /  DBili  x   /  AST  178<H>  /  ALT  74<H>  /  AlkPhos  79  12-24    PT/INR - ( 24 Dec 2021 22:33 )   PT: 13.7 sec;   INR: 1.19 ratio         PTT - ( 24 Dec 2021 22:33 )  PTT:33.8 sec  CARDIAC MARKERS ( 25 Dec 2021 05:08 )  x     / <0.01 ng/mL / x     / x     / x      CARDIAC MARKERS ( 24 Dec 2021 22:33 )  x     / <0.01 ng/mL / x     / x     / x          Urinalysis Basic - ( 25 Dec 2021 00:11 )    Color: Yellow / Appearance: Clear / S.010 / pH: x  Gluc: x / Ketone: Small  / Bili: Negative / Urobili: 1 mg/dL   Blood: x / Protein: Negative / Nitrite: Negative   Leuk Esterase: Negative / RBC: x / WBC x   Sq Epi: x / Non Sq Epi: x / Bacteria: x        CAPILLARY BLOOD GLUCOSE    RADIOLOGY & ADDITIONAL TESTS:  Results Reviewed:   Imaging Personally Reviewed:  Electrocardiogram Personally Reviewed:

## 2021-12-27 LAB
ALBUMIN SERPL ELPH-MCNC: 4.1 G/DL — SIGNIFICANT CHANGE UP (ref 3.3–5.2)
ALP SERPL-CCNC: 80 U/L — SIGNIFICANT CHANGE UP (ref 40–120)
ALT FLD-CCNC: 69 U/L — HIGH
ANION GAP SERPL CALC-SCNC: 12 MMOL/L — SIGNIFICANT CHANGE UP (ref 5–17)
AST SERPL-CCNC: 78 U/L — HIGH
BASOPHILS # BLD AUTO: 0.05 K/UL — SIGNIFICANT CHANGE UP (ref 0–0.2)
BASOPHILS NFR BLD AUTO: 0.5 % — SIGNIFICANT CHANGE UP (ref 0–2)
BILIRUB SERPL-MCNC: 0.4 MG/DL — SIGNIFICANT CHANGE UP (ref 0.4–2)
BUN SERPL-MCNC: 5.5 MG/DL — LOW (ref 8–20)
CALCIUM SERPL-MCNC: 8.9 MG/DL — SIGNIFICANT CHANGE UP (ref 8.6–10.2)
CHLORIDE SERPL-SCNC: 99 MMOL/L — SIGNIFICANT CHANGE UP (ref 98–107)
CO2 SERPL-SCNC: 27 MMOL/L — SIGNIFICANT CHANGE UP (ref 22–29)
CREAT SERPL-MCNC: 0.34 MG/DL — LOW (ref 0.5–1.3)
EOSINOPHIL # BLD AUTO: 0.18 K/UL — SIGNIFICANT CHANGE UP (ref 0–0.5)
EOSINOPHIL NFR BLD AUTO: 1.7 % — SIGNIFICANT CHANGE UP (ref 0–6)
GLUCOSE BLDC GLUCOMTR-MCNC: 179 MG/DL — HIGH (ref 70–99)
GLUCOSE SERPL-MCNC: 234 MG/DL — HIGH (ref 70–99)
HCT VFR BLD CALC: 36.8 % — LOW (ref 39–50)
HCV AB S/CO SERPL IA: 0.15 S/CO — SIGNIFICANT CHANGE UP (ref 0–0.99)
HCV AB SERPL-IMP: SIGNIFICANT CHANGE UP
HGB BLD-MCNC: 11.9 G/DL — LOW (ref 13–17)
IMM GRANULOCYTES NFR BLD AUTO: 0.2 % — SIGNIFICANT CHANGE UP (ref 0–1.5)
LYMPHOCYTES # BLD AUTO: 1.31 K/UL — SIGNIFICANT CHANGE UP (ref 1–3.3)
LYMPHOCYTES # BLD AUTO: 12.3 % — LOW (ref 13–44)
MAGNESIUM SERPL-MCNC: 2.1 MG/DL — SIGNIFICANT CHANGE UP (ref 1.6–2.6)
MCHC RBC-ENTMCNC: 27.9 PG — SIGNIFICANT CHANGE UP (ref 27–34)
MCHC RBC-ENTMCNC: 32.3 GM/DL — SIGNIFICANT CHANGE UP (ref 32–36)
MCV RBC AUTO: 86.4 FL — SIGNIFICANT CHANGE UP (ref 80–100)
MONOCYTES # BLD AUTO: 1.04 K/UL — HIGH (ref 0–0.9)
MONOCYTES NFR BLD AUTO: 9.8 % — SIGNIFICANT CHANGE UP (ref 2–14)
NEUTROPHILS # BLD AUTO: 8.06 K/UL — HIGH (ref 1.8–7.4)
NEUTROPHILS NFR BLD AUTO: 75.5 % — SIGNIFICANT CHANGE UP (ref 43–77)
PHOSPHATE SERPL-MCNC: 3.5 MG/DL — SIGNIFICANT CHANGE UP (ref 2.4–4.7)
PLATELET # BLD AUTO: 262 K/UL — SIGNIFICANT CHANGE UP (ref 150–400)
POTASSIUM SERPL-MCNC: 3.8 MMOL/L — SIGNIFICANT CHANGE UP (ref 3.5–5.3)
POTASSIUM SERPL-SCNC: 3.8 MMOL/L — SIGNIFICANT CHANGE UP (ref 3.5–5.3)
PROT SERPL-MCNC: 7 G/DL — SIGNIFICANT CHANGE UP (ref 6.6–8.7)
RBC # BLD: 4.26 M/UL — SIGNIFICANT CHANGE UP (ref 4.2–5.8)
RBC # FLD: 14.3 % — SIGNIFICANT CHANGE UP (ref 10.3–14.5)
SODIUM SERPL-SCNC: 138 MMOL/L — SIGNIFICANT CHANGE UP (ref 135–145)
WBC # BLD: 10.66 K/UL — HIGH (ref 3.8–10.5)
WBC # FLD AUTO: 10.66 K/UL — HIGH (ref 3.8–10.5)

## 2021-12-27 PROCEDURE — 99233 SBSQ HOSP IP/OBS HIGH 50: CPT

## 2021-12-27 PROCEDURE — 93010 ELECTROCARDIOGRAM REPORT: CPT

## 2021-12-27 RX ADMIN — Medication 250 MILLIGRAM(S): at 22:19

## 2021-12-27 RX ADMIN — Medication 600 MILLIGRAM(S): at 19:03

## 2021-12-27 RX ADMIN — Medication 600 MILLIGRAM(S): at 05:58

## 2021-12-27 RX ADMIN — Medication 40 MILLIEQUIVALENT(S): at 01:00

## 2021-12-27 RX ADMIN — Medication 2 PUFF(S): at 15:57

## 2021-12-27 RX ADMIN — LOSARTAN POTASSIUM 100 MILLIGRAM(S): 100 TABLET, FILM COATED ORAL at 22:19

## 2021-12-27 RX ADMIN — CLOZAPINE 200 MILLIGRAM(S): 150 TABLET, ORALLY DISINTEGRATING ORAL at 22:19

## 2021-12-27 RX ADMIN — CLOZAPINE 25 MILLIGRAM(S): 150 TABLET, ORALLY DISINTEGRATING ORAL at 22:19

## 2021-12-27 RX ADMIN — Medication 2 PUFF(S): at 08:26

## 2021-12-27 RX ADMIN — ENOXAPARIN SODIUM 40 MILLIGRAM(S): 100 INJECTION SUBCUTANEOUS at 14:24

## 2021-12-27 NOTE — PROGRESS NOTE ADULT - ASSESSMENT
56 yr old male from [facility] on the [place] [facility] with medical history of Schizo, DM, Obesity, HTN was sent in for right foot wound, with incidental finding of PNA.    # GGOs, tree in the bud opacities on imaging, leucocytosis= CAP  possible post obstructive  -Blood c/s sent from ED  -refusing IV placement, will change abx to Levaquin   -If no improvement will escalate, will consult ID  -Cont maintenance Combivent inhaler  -Mucinex  -COVID negative, vaccinated    # Ankle wound  -Levaquin as above  -wound care consulted, emilio recs    # Acute hypoxic respiratory failure  -Not tolerating NC, occasionally ripping them off  -etio- sec to above    # 3.9 x 2.8 cm nodular right upper lobe opacity on imaging= highly suspicious for malignancy  -Need further details about chronicity, acceptance of medical intervention  - consult requested to get info from his facility about NOK, HCP etc   -If no HCP, will obtain ethics consult    # Hyponatremia  -likely chronic sec to psych meds, improving after IVF  -daily BMP    # Schizophrenia  -keeps wandering pulling off O2  -Cont Valproate/ Clozaril  -psych consulted, emilio recs    # DM2 uncontrolled  -high HgbA1c  -Lantus, premeal, ISS here    # HTN controlled  Cont Cozaar    Lovenox    Dispo: pending weaning off NC and PNA treatment    Social work eval pending for establishing of HCP

## 2021-12-27 NOTE — ADVANCED PRACTICE NURSE CONSULT - ASSESSMENT
Edematous B/L LE.      ·	Left Medial Foot - Ulcerations  2 sites: Superior 2.5x3cm and Inferior 1.5x2cm - brown, indurated dry wound bed, well defined borders/edges, no drainage, periwound red, erythremic warm (local infection/cellulitis) extending 5cm from wound beds

## 2021-12-27 NOTE — PROGRESS NOTE ADULT - SUBJECTIVE AND OBJECTIVE BOX
[facility] Division of Hospital Medicine Progress Note    CONTACT INFO:  Garland Pavon M.D.  [phone]    Patient is a 56y old  Male who presents with a chief complaint of foot wound (26 Dec 2021 12:26)      SUBJECTIVE / OVERNIGHT EVENTS: no concerns.     Patient denies chest pain, SOB, abd pain, N/V, fever, chills, dysuria or any other complaints. All remainder ROS negative.     MEDICATIONS  (STANDING):  albuterol/ipratropium (CFC free) Inhaler. 2 Puff(s) Inhalation four times a day  cloZAPine 200 milliGRAM(s) Oral at bedtime  cloZAPine 25 milliGRAM(s) Oral at bedtime  enoxaparin Injectable 40 milliGRAM(s) SubCutaneous daily  guaiFENesin  milliGRAM(s) Oral every 12 hours  levoFLOXacin  Tablet 750 milliGRAM(s) Oral every 24 hours  losartan 100 milliGRAM(s) Oral at bedtime  valproic  acid Syrup 250 milliGRAM(s) Oral at bedtime    MEDICATIONS  (PRN):  melatonin 3 milliGRAM(s) Oral at bedtime PRN Sleep      I&O's Summary      PHYSICAL EXAM:  Vital Signs Last 24 Hrs  T(C): 36.4 (27 Dec 2021 15:52), Max: 99 (27 Dec 2021 05:52)  T(F): 97.5 (27 Dec 2021 15:52), Max: 210.2 (27 Dec 2021 05:52)  HR: 84 (27 Dec 2021 15:52) (84 - 92)  BP: 144/83 (27 Dec 2021 15:52) (136/89 - 167/74)  BP(mean): --  RR: 20 (27 Dec 2021 15:52) (20 - 20)  SpO2: 95% (27 Dec 2021 15:52) (90% - 95%)    GENERAL: Morbid Obesity, sitting in bed, no NC  HEAD:  Atraumatic, Normocephalic  EYES: conjunctiva and sclera clear  NECK: Supple, No JVD, Normal thyroid  NERVOUS SYSTEM: Motor Strength 5/5 B/L upper and lower extremities  CHEST/LUNG: CTA bilaterally; No rales, rhonchi, wheezing, or rubs  HEART: Regular rate and rhythm; No murmurs, rubs, or gallops. + LE edema b/l  ABDOMEN: Obese, Soft, Nontender, Nondistended; Bowel sounds present  EXTREMITIES:  2+ Peripheral Pulses, No clubbing, cyanosis, or edema  RIGHT FOOT- inner ankle- 2 areas of abrasion with healing scab formation, mild erythema, no tenderness, no discharge    LABS:                        11.9   10.66 )-----------( 262      ( 27 Dec 2021 08:13 )             36.8     12-27    138  |  99  |  5.5<L>  ----------------------------<  234<H>  3.8   |  27.0  |  0.34<L>    Ca    8.9      27 Dec 2021 08:13  Phos  3.5     12-27  Mg     2.1     12-27    TPro  7.0  /  Alb  4.1  /  TBili  0.4  /  DBili  x   /  AST  78<H>  /  ALT  69<H>  /  AlkPhos  80  12-27          Culture - Urine (collected 25 Dec 2021 06:38)  Source: Clean Catch Clean Catch (Midstream)  Final Report (26 Dec 2021 12:44):    <10,000 CFU/mL Normal Urogenital [place]    Culture - Blood (collected 24 Dec 2021 22:36)  Source: .Blood Blood-Peripheral  Preliminary Report (26 Dec 2021 23:00):    No growth at 48 hours    Culture - Blood (collected 24 Dec 2021 22:36)  Source: .Blood Blood-Peripheral  Preliminary Report (26 Dec 2021 23:00):    No growth at 48 hours      CAPILLARY BLOOD GLUCOSE      POCT Blood Glucose.: 179 mg/dL (27 Dec 2021 08:13)      RADIOLOGY & ADDITIONAL TESTS:  Results Reviewed:   Imaging Personally Reviewed:  Electrocardiogram Personally Reviewed:

## 2021-12-28 LAB
A1C WITH ESTIMATED AVERAGE GLUCOSE RESULT: 6.8 % — HIGH (ref 4–5.6)
ALBUMIN SERPL ELPH-MCNC: 3.6 G/DL — SIGNIFICANT CHANGE UP (ref 3.3–5.2)
ALP SERPL-CCNC: 74 U/L — SIGNIFICANT CHANGE UP (ref 40–120)
ALT FLD-CCNC: 55 U/L — HIGH
ANION GAP SERPL CALC-SCNC: 13 MMOL/L — SIGNIFICANT CHANGE UP (ref 5–17)
AST SERPL-CCNC: 43 U/L — HIGH
BASOPHILS # BLD AUTO: 0.04 K/UL — SIGNIFICANT CHANGE UP (ref 0–0.2)
BASOPHILS NFR BLD AUTO: 0.4 % — SIGNIFICANT CHANGE UP (ref 0–2)
BILIRUB SERPL-MCNC: 0.5 MG/DL — SIGNIFICANT CHANGE UP (ref 0.4–2)
BUN SERPL-MCNC: 7.4 MG/DL — LOW (ref 8–20)
CALCIUM SERPL-MCNC: 8.5 MG/DL — LOW (ref 8.6–10.2)
CHLORIDE SERPL-SCNC: 98 MMOL/L — SIGNIFICANT CHANGE UP (ref 98–107)
CO2 SERPL-SCNC: 27 MMOL/L — SIGNIFICANT CHANGE UP (ref 22–29)
CREAT SERPL-MCNC: 0.39 MG/DL — LOW (ref 0.5–1.3)
EOSINOPHIL # BLD AUTO: 0.2 K/UL — SIGNIFICANT CHANGE UP (ref 0–0.5)
EOSINOPHIL NFR BLD AUTO: 1.9 % — SIGNIFICANT CHANGE UP (ref 0–6)
ESTIMATED AVERAGE GLUCOSE: 148 MG/DL — HIGH (ref 68–114)
GAS PNL BLDA: SIGNIFICANT CHANGE UP
GLUCOSE SERPL-MCNC: 186 MG/DL — HIGH (ref 70–99)
HCT VFR BLD CALC: 35.2 % — LOW (ref 39–50)
HGB BLD-MCNC: 11.3 G/DL — LOW (ref 13–17)
IMM GRANULOCYTES NFR BLD AUTO: 0.2 % — SIGNIFICANT CHANGE UP (ref 0–1.5)
LYMPHOCYTES # BLD AUTO: 1.17 K/UL — SIGNIFICANT CHANGE UP (ref 1–3.3)
LYMPHOCYTES # BLD AUTO: 11 % — LOW (ref 13–44)
MAGNESIUM SERPL-MCNC: 2 MG/DL — SIGNIFICANT CHANGE UP (ref 1.6–2.6)
MCHC RBC-ENTMCNC: 28 PG — SIGNIFICANT CHANGE UP (ref 27–34)
MCHC RBC-ENTMCNC: 32.1 GM/DL — SIGNIFICANT CHANGE UP (ref 32–36)
MCV RBC AUTO: 87.1 FL — SIGNIFICANT CHANGE UP (ref 80–100)
MONOCYTES # BLD AUTO: 1.05 K/UL — HIGH (ref 0–0.9)
MONOCYTES NFR BLD AUTO: 9.8 % — SIGNIFICANT CHANGE UP (ref 2–14)
NEUTROPHILS # BLD AUTO: 8.18 K/UL — HIGH (ref 1.8–7.4)
NEUTROPHILS NFR BLD AUTO: 76.7 % — SIGNIFICANT CHANGE UP (ref 43–77)
PHOSPHATE SERPL-MCNC: 4.2 MG/DL — SIGNIFICANT CHANGE UP (ref 2.4–4.7)
PLATELET # BLD AUTO: 252 K/UL — SIGNIFICANT CHANGE UP (ref 150–400)
POTASSIUM SERPL-MCNC: 3.7 MMOL/L — SIGNIFICANT CHANGE UP (ref 3.5–5.3)
POTASSIUM SERPL-SCNC: 3.7 MMOL/L — SIGNIFICANT CHANGE UP (ref 3.5–5.3)
PROT SERPL-MCNC: 6.8 G/DL — SIGNIFICANT CHANGE UP (ref 6.6–8.7)
RBC # BLD: 4.04 M/UL — LOW (ref 4.2–5.8)
RBC # FLD: 14.4 % — SIGNIFICANT CHANGE UP (ref 10.3–14.5)
SODIUM SERPL-SCNC: 138 MMOL/L — SIGNIFICANT CHANGE UP (ref 135–145)
WBC # BLD: 10.66 K/UL — HIGH (ref 3.8–10.5)
WBC # FLD AUTO: 10.66 K/UL — HIGH (ref 3.8–10.5)

## 2021-12-28 PROCEDURE — 99222 1ST HOSP IP/OBS MODERATE 55: CPT

## 2021-12-28 PROCEDURE — 99223 1ST HOSP IP/OBS HIGH 75: CPT

## 2021-12-28 PROCEDURE — 99233 SBSQ HOSP IP/OBS HIGH 50: CPT

## 2021-12-28 RX ORDER — DIAZEPAM 5 MG
10 TABLET ORAL ONCE
Refills: 0 | Status: DISCONTINUED | OUTPATIENT
Start: 2021-12-28 | End: 2021-12-28

## 2021-12-28 RX ORDER — VALPROIC ACID (AS SODIUM SALT) 250 MG/5ML
1000 SOLUTION, ORAL ORAL AT BEDTIME
Refills: 0 | Status: DISCONTINUED | OUTPATIENT
Start: 2021-12-28 | End: 2021-12-30

## 2021-12-28 RX ORDER — HALOPERIDOL DECANOATE 100 MG/ML
5 INJECTION INTRAMUSCULAR ONCE
Refills: 0 | Status: COMPLETED | OUTPATIENT
Start: 2021-12-28 | End: 2021-12-28

## 2021-12-28 RX ORDER — OLANZAPINE 15 MG/1
5 TABLET, FILM COATED ORAL ONCE
Refills: 0 | Status: COMPLETED | OUTPATIENT
Start: 2021-12-28 | End: 2021-12-28

## 2021-12-28 RX ADMIN — CLOZAPINE 25 MILLIGRAM(S): 150 TABLET, ORALLY DISINTEGRATING ORAL at 21:17

## 2021-12-28 RX ADMIN — Medication 600 MILLIGRAM(S): at 05:00

## 2021-12-28 RX ADMIN — CLOZAPINE 200 MILLIGRAM(S): 150 TABLET, ORALLY DISINTEGRATING ORAL at 21:16

## 2021-12-28 RX ADMIN — LOSARTAN POTASSIUM 100 MILLIGRAM(S): 100 TABLET, FILM COATED ORAL at 21:17

## 2021-12-28 RX ADMIN — Medication 2 PUFF(S): at 03:06

## 2021-12-28 RX ADMIN — OLANZAPINE 5 MILLIGRAM(S): 15 TABLET, FILM COATED ORAL at 22:34

## 2021-12-28 RX ADMIN — Medication 2 PUFF(S): at 12:17

## 2021-12-28 RX ADMIN — Medication 10 MILLIGRAM(S): at 23:42

## 2021-12-28 RX ADMIN — Medication 10 MILLIGRAM(S): at 23:13

## 2021-12-28 RX ADMIN — Medication 1000 MILLIGRAM(S): at 21:17

## 2021-12-28 RX ADMIN — Medication 2 PUFF(S): at 09:16

## 2021-12-28 RX ADMIN — Medication 100 MILLIGRAM(S): at 18:31

## 2021-12-28 RX ADMIN — OLANZAPINE 5 MILLIGRAM(S): 15 TABLET, FILM COATED ORAL at 01:43

## 2021-12-28 RX ADMIN — Medication 600 MILLIGRAM(S): at 18:31

## 2021-12-28 RX ADMIN — Medication 3 MILLIGRAM(S): at 21:17

## 2021-12-28 NOTE — CHART NOTE - NSCHARTNOTEFT_GEN_A_CORE
called to see pt who was found to have increased respiratory rate and an oxygen saturation in mid 80's on 6L NC.  Pt also noted to be abdominal breathing which he had not been doing in past.  Pt has 1:1 at bedside and noted this, calling RN to bedside.  Pt offers no complaints of pain/discomfort.  Pt admitted for Right foot infection and also found to have pna    PAST MEDICAL & SURGICAL HISTORY:  Schizophrenia  DM  Obesity  HTN    ROS: denied by pt    Allergies  No Known Allergies  No known Intolerances    Social History:  unknown    FAMILY HISTORY:  unknown       albuterol/ipratropium (CFC free) Inhaler. 2 Puff(s) Inhalation four times a day  cloZAPine 200 milliGRAM(s) Oral at bedtime  cloZAPine 25 milliGRAM(s) Oral at bedtime  enoxaparin Injectable 40 milliGRAM(s) SubCutaneous daily  guaiFENesin  milliGRAM(s) Oral every 12 hours  levoFLOXacin  Tablet 750 milliGRAM(s) Oral every 24 hours  losartan 100 milliGRAM(s) Oral at bedtime  melatonin 3 milliGRAM(s) Oral at bedtime PRN  valproic  acid Syrup 250 milliGRAM(s) Oral at bedtime called to see pt who was found to have increased respiratory rate and an oxygen saturation in mid 80's on 6L NC.  Pt also noted to be abdominal breathing which he had not been doing in past.  Pt has 1:1 at bedside and noted this, calling RN to bedside.  Pt offers no complaints of pain/discomfort.  Pt admitted for Right foot infection and also found to have pna    PAST MEDICAL & SURGICAL HISTORY:  Schizophrenia  DM  Obesity  HTN    ROS: denied by pt    Allergies  No Known Allergies  No known Intolerances    Social History:  unknown    FAMILY HISTORY:  unknown       albuterol/ipratropium (CFC free) Inhaler. 2 Puff(s) Inhalation four times a day  cloZAPine 200 milliGRAM(s) Oral at bedtime  cloZAPine 25 milliGRAM(s) Oral at bedtime  enoxaparin Injectable 40 milliGRAM(s) SubCutaneous daily  guaiFENesin  milliGRAM(s) Oral every 12 hours  levoFLOXacin  Tablet 750 milliGRAM(s) Oral every 24 hours  losartan 100 milliGRAM(s) Oral at bedtime  melatonin 3 milliGRAM(s) Oral at bedtime PRN  valproic  acid Syrup 250 milliGRAM(s) Oral at bedtime    On examination:  Vital Signs Last 24 Hrs  T(C): 37.1 (28 Dec 2021 02:45), Max: 99 (27 Dec 2021 05:52)  T(F): 98.8 (28 Dec 2021 02:45), Max: 210.2 (27 Dec 2021 05:52)  HR: 89 (28 Dec 2021 02:45) (84 - 92)  BP: 147/90 (28 Dec 2021 02:45) (136/89 - 155/72)  BP(mean): --  RR: 20 (28 Dec 2021 02:45) (20 - 20)  SpO2: 94% (28 Dec 2021 03:07) (90% - 95%)  Pt awake, alert, talking in sentences but not clear conversation sequence +belly breathing  Thorax- no intercostal retraction or use of accessory muscles  Lungs- clear bilaterally no rales/rhonchi or wheezes  Heart- s1s2 heard, RRR no murmur appreciated  Abdomen- soft +bowel sounds all quadrants, nontender with no appreciated hepatosplenomegaly  Extremities without cyanosis, clubbing or edema    ABG - ( 28 Dec 2021 03:23 )  pH, Arterial: 7.400 pH, Blood: x     /  pCO2: 51    /  pO2: 95    / HCO3: 32    / Base Excess: 6.8   /  SaO2: 98.5                          11.3   10.66 )-----------( 252      ( 28 Dec 2021 03:29 )             35.2     27 Dec 2021 08:13    138    |  99     |  5.5    ----------------------------<  234    3.8     |  27.0   |  0.34     Ca    8.9        27 Dec 2021 08:13  Phos  3.5       27 Dec 2021 08:13  Mg     2.1       27 Dec 2021 08:13    TPro  7.0    /  Alb  4.1    /  TBili  0.4    /  DBili  x      /  AST  78     /  ALT  69     /  AlkPhos  80     27 Dec 2021 08:13    LIVER FUNCTIONS - ( 27 Dec 2021 08:13 )  Alb: 4.1 g/dL / Pro: 7.0 g/dL / ALK PHOS: 80 U/L / ALT: 69 U/L / AST: 78 U/L / GGT: x           POCT Blood Glucose.: 179 mg/dL (27 Dec 2021 08:13)  Lactate 0.60          Impression: 1- tachypnea, etiology unclear                    2- transient episode of hypoxia                    3- pna                    4- right foot wound/infection  Plan: 1- abg done          2- no further treatment at this time

## 2021-12-28 NOTE — CONSULT NOTE ADULT - SUBJECTIVE AND OBJECTIVE BOX
INFECTIOUS DISEASES AND INTERNAL MEDICINE at [place]  =======================================================  Marcelo Beltre MD  Diplomates American Board of Internal Medicine and Infectious Diseases  Telephone [phone]  Fax            [phone]  =======================================================    TIFFANY [id number]      HPI:  56 yr old male from [facility] on the [facility] with medical history of Schizo, DM, Obesity, HTN was sent in for right foot wound, which he states was noted yesterday. He does not recall trauma. However in ED, the wound appears > 1 day old and incidentally noted pulm infiltrates and nodule on imaging. Denies fevers, dyspnea, cough. States his wheezing/ congestion is regular and he takes inhalers.   ASKED TO EVALUATE FROM ID STANDPOINT     SH- per records, no habits  FH- none available   (25 Dec 2021 11:59)      PAST MEDICAL & SURGICAL HISTORY:  Schizophrenia    No significant past surgical history        ANTIBIOTICS  doxycycline hyclate Capsule 100 milliGRAM(s) Oral every 12 hours      Allergies    No Known Allergies    Intolerances        SOCIAL HISTORY:     FAMILY HX   FAMILY HISTORY:      Vital Signs Last 24 Hrs  T(C): 36.5 (28 Dec 2021 16:14), Max: 37.1 (28 Dec 2021 02:45)  T(F): 97.7 (28 Dec 2021 16:14), Max: 98.8 (28 Dec 2021 02:45)  HR: 93 (28 Dec 2021 16:14) (80 - 93)  BP: 148/87 (28 Dec 2021 16:14) (140/89 - 155/72)  BP(mean): --  RR: 20 (28 Dec 2021 16:14) (20 - 20)  SpO2: 84% (28 Dec 2021 16:14) (84% - 99%)  Drug Dosing Weight  Height (cm): 177.8 (24 Dec 2021 21:18)      REVIEW OF SYSTEMS:   UNABLE TO OBTAIN ROS                  PHYSICAL EXAMINATION:    GENERAL: The patient is a _____in no apparent distress. ___     VITAL SIGNS: T(C): 36.5 (12-28-21 @ 16:14), Max: 37.1 (12-28-21 @ 02:45)  HR: 93 (12-28-21 @ 16:14) (80 - 93)  BP: 148/87 (12-28-21 @ 16:14) (140/89 - 155/72)  RR: 20 (12-28-21 @ 16:14) (20 - 20)  SpO2: 84% (12-28-21 @ 16:14) (84% - 99%)  Wt(kg): --    HEENT: Head is normocephalic and atraumatic.  ANICTERIC  NECK: Supple. No carotid bruits.  No lymphadenopathy or thyromegaly.    LUNGS:COARSE BREATH SOUNDS    HEART: Regular rate and rhythm without murmur.    ABDOMEN: Soft, nontender, and nondistended.  Positive bowel sounds.  No hepatosplenomegaly was noted. NO REBOUND NO GUARDING    EXTREMITIES: NO EDEMA NO ERYTHEMA    NEUROLOGIC: CONFUSED       SKIN: No ulceration or induration present. NO RASH        BLOOD CULTURES  Culture Results:   <10,000 CFU/mL Normal Urogenital [place] (12-25 @ 06:38)  Culture Results:   No growth at 48 hours (12-24 @ 22:36)  Culture Results:   No growth at 48 hours (12-24 @ 22:36)       URINE CX          LABS:                        11.3   10.66 )-----------( 252      ( 28 Dec 2021 03:29 )             35.2     12-28    138  |  98  |  7.4<L>  ----------------------------<  186<H>  3.7   |  27.0  |  0.39<L>    Ca    8.5<L>      28 Dec 2021 03:29  Phos  4.2     12-28  Mg     2.0     12-28    TPro  6.8  /  Alb  3.6  /  TBili  0.5  /  DBili  x   /  AST  43<H>  /  ALT  55<H>  /  AlkPhos  74  12-28          RADIOLOGY & ADDITIONAL STUDIES:      ASSESSMENT/PLAN  56 yr old male from [facility] on the [facility] with medical history of Schizo, DM, Obesity, HTN was sent in for right foot wound, which he states was noted yesterday. He does not recall trauma. However in ED, the wound appears > 1 day old and incidentally noted pulm infiltrates and nodule on imaging. Denies fevers, dyspnea, cough. States his wheezing/ congestion is regular and he takes inhalers.   AS ABOVE PT WITH RIGHT FOOT WOUND UNCLEAR ETIOLOGY   WILL FOLLOW UP BLOOD CX   PT REFUSING IV ABX  ON DOXY WOULD ADD AUGMENTIN FOR  ADDITIONAL  COVERAGE   CONSIDER PODIATRY EVAL  WILL FOLLOW UP WITH FURTHER RECOMMENDATIONS               CLAUDIO ORTEGA MD

## 2021-12-28 NOTE — BH CONSULTATION LIAISON ASSESSMENT NOTE - HPI (INCLUDE ILLNESS QUALITY, SEVERITY, DURATION, TIMING, CONTEXT, MODIFYING FACTORS, ASSOCIATED SIGNS AND SYMPTOMS)
55 y/o M PMhx Schizophrenia, HTN, Obesity, DM2, presented originally with R foot wound and SOB found to have PNA psychiatric evaluation for hx of schizophrenia. Patient calm and cooperative at bedside but with disorganized thought process and increased latency. Patient states that he has been doing well with his psychiatric medication and is interested in returning back to [facility] when he is able. Patient reports that he is feeling okay and denies SIB, SI, HI, or hallucinations.

## 2021-12-28 NOTE — PATIENT PROFILE ADULT - FALL HARM RISK - HARM RISK INTERVENTIONS
Assistance with ambulation/Assistance OOB with selected safe patient handling equipment/Communicate Risk of Fall with Harm to all staff/Reinforce activity limits and safety measures with patient and family/Tailored Fall Risk Interventions/Use of alarms - bed, chair and/or voice tab/Visual Cue: Yellow wristband and red socks/Bed in lowest position, wheels locked, appropriate side rails in place/Call bell, personal items and telephone in reach/Instruct patient to call for assistance before getting out of bed or chair/Non-slip footwear when patient is out of bed/[place] to call system/Physically safe environment - no spills, clutter or unnecessary equipment/Purposeful Proactive Rounding/Room/bathroom lighting operational, light cord in reach

## 2021-12-28 NOTE — BH CONSULTATION LIAISON ASSESSMENT NOTE - OTHER PAST PSYCHIATRIC HISTORY (INCLUDE DETAILS REGARDING ONSET, COURSE OF ILLNESS, INPATIENT/OUTPATIENT TREATMENT)
Schizophrenia currently living at [facility], and sees Dr. Chavez at [facility]. Currently on Clozapine 225mg at bedtime, IM haldol 100mg (unsure date of last dose), and Depakote 1000mg at bedtime.

## 2021-12-28 NOTE — BH CONSULTATION LIAISON ASSESSMENT NOTE - NSBHCONSULTPRIMARYDISCUSSYES_PSY_A_CORE FT
Continue with current regimen for Schizophrenia, return to [facility] with outpatient follow up when medically cleared

## 2021-12-28 NOTE — BH CONSULTATION LIAISON ASSESSMENT NOTE - SUMMARY
52 y/o M PMhx Schizophrenia, HTN, DM2, Obesity, presented for right foot wound, incidentally found to have PNA. Psych consult performed due to question of refusal of medications and history of schizophrenia

## 2021-12-28 NOTE — BH CONSULTATION LIAISON ASSESSMENT NOTE - CURRENT MEDICATION
MEDICATIONS  (STANDING):  albuterol/ipratropium (CFC free) Inhaler. 2 Puff(s) Inhalation four times a day  cloZAPine 200 milliGRAM(s) Oral at bedtime  cloZAPine 25 milliGRAM(s) Oral at bedtime  enoxaparin Injectable 40 milliGRAM(s) SubCutaneous daily  guaiFENesin  milliGRAM(s) Oral every 12 hours  levoFLOXacin  Tablet 750 milliGRAM(s) Oral every 24 hours  losartan 100 milliGRAM(s) Oral at bedtime  valproic  acid Syrup 1000 milliGRAM(s) Oral at bedtime    MEDICATIONS  (PRN):  melatonin 3 milliGRAM(s) Oral at bedtime PRN Sleep

## 2021-12-28 NOTE — BH CONSULTATION LIAISON ASSESSMENT NOTE - NSBHCHARTREVIEWVS_PSY_A_CORE FT
Vital Signs Last 24 Hrs  T(C): 37.1 (28 Dec 2021 12:43), Max: 37.1 (28 Dec 2021 02:45)  T(F): 98.8 (28 Dec 2021 12:43), Max: 98.8 (28 Dec 2021 02:45)  HR: 93 (28 Dec 2021 12:43) (80 - 93)  BP: 140/89 (28 Dec 2021 12:43) (140/89 - 155/72)  BP(mean): --  RR: 20 (28 Dec 2021 12:43) (20 - 20)  SpO2: 87% (28 Dec 2021 12:43) (87% - 99%)
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
## 2021-12-28 NOTE — BH CONSULTATION LIAISON ASSESSMENT NOTE - RISK ASSESSMENT
Low acute suicide risk. Protective factors include residential stability and engagement and treatment. Risk factors include history of schizophrenia
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
## 2021-12-28 NOTE — PROGRESS NOTE ADULT - ASSESSMENT
56 yr old male from [facility] on the [place] grounds with medical history of Schizo, DM, Obesity, HTN was sent in for right foot wound, with incidental finding of PNA.    # GGOs, tree in the bud opacities on imaging, leucocytosis= CAP  possible post obstructive  -Blood c/s sent from ED  -refusing IV placement, s/p Levaquin, c/w Doxycyline as below  -If no improvement will escalate, will consult ID  -Cont maintenance Combivent inhaler  -Mucinex  -COVID negative, vaccinated    # Ankle wound, now with worsening surrounding skin erythema  -change to doxy to cover MRSA  -A1C 6.8  -wound care consulted, jj recs  -ID consulted for PO abx choice    # Acute hypoxic respiratory failure  -Not tolerating NC, occasionally ripping them off  -etio- sec to above  -?2/2 component underlying malignancy    # 3.9 x 2.8 cm nodular right upper lobe opacity on imaging= highly suspicious for malignancy  -patient pleasant but does not appear to understand his underlying medication condition and address the lung findings with him  -spoke with , patient does not seen to have any HCP/next of kin, under care of a   -Psych and ethics consulted, jj recs    # Schizophrenia  -keeps wandering pulling off O2  -Cont Valproate/ Clozaril  -psych consulted, jj recs    # DM2   -HgbA1c 6.8  -Lantus, premeal, ISS here    # HTN controlled  Cont Cozaar    Lovenox    Dispo: pending ID recs and decision on work up of malignancy  Jj ID, psych and ethics input 56 yr old male from [facility] on the [place] grounds with medical history of Schizo, DM, Obesity, HTN was sent in for right foot wound, with incidental finding of PNA.    # GGOs, tree in the bud opacities on imaging, leucocytosis= CAP  possible post obstructive  -Blood c/s sent from ED  -refusing IV placement, s/p Levaquin, c/w Doxycyline as below  -If no improvement will escalate, will consult ID  -Cont maintenance Combivent inhaler  -Mucinex  -COVID negative, vaccinated    # Ankle wound, now with worsening surrounding skin erythema  -change to doxy to cover MRSA  -A1C 6.8  -wound care consulted, jj recs  -ID consulted for PO abx choice    # Acute hypoxic respiratory failure  -Not tolerating NC, occasionally ripping them off  -CT scan result noted below   -etio- sec to above  -ATC albuterol  -?2/2 component underlying malignancy    # 3.9 x 2.8 cm nodular right upper lobe opacity on imaging= highly suspicious for malignancy  -patient pleasant but does not appear to understand his underlying medication condition and address the lung findings with him  -spoke with , patient does not seen to have any HCP/next of kin, under care of a   -Psych and ethics consulted, jj recs    # Schizophrenia  -keeps wandering pulling off O2  -Cont Valproate/ Clozaril  -psych consulted, jj recs    # DM2   -HgbA1c 6.8  -Lantus, premeal, ISS here    # HTN controlled  Cont Cozaar    Lovenox    Dispo: pending ID recs and decision on work up of malignancy  Jj ID, psych and ethics input
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
## 2021-12-28 NOTE — BH CONSULTATION LIAISON ASSESSMENT NOTE - NS ED B[facility] MED ROS PSYCHIATRIC
"Chief Complaint   Patient presents with     Colposcopy     Pap 3/23/2021 NIL    HPV POS 16        Initial /74 (BP Location: Left arm, Cuff Size: Adult Regular)   Pulse 73   LMP 2021 (Approximate)   Breastfeeding No  Estimated body mass index is 27.65 kg/m  as calculated from the following:    Height as of 3/23/21: 1.626 m (5' 4\").    Weight as of 3/23/21: 73.1 kg (161 lb 1.6 oz).  BP completed using cuff size: regular    Questioned patient about current smoking habits.  Pt. currently smokes.  Advised about smoking cessation.          The following HM Due: NONE      Lelia Sanderson, MORIS on 2021 at 11:03 AM  "
See HPI

## 2021-12-28 NOTE — PROGRESS NOTE ADULT - SUBJECTIVE AND OBJECTIVE BOX
[facility] Division of Hospital Medicine Progress Note    CONTACT INFO:  Garland Pavon M.D.  [phone]    Patient is a 56y old  Male who presents with a chief complaint of foot wound (27 Dec 2021 16:22)      SUBJECTIVE / OVERNIGHT EVENTS: no event overnight. Continues to refuse IV and NC.     Patient denies chest pain, SOB, abd pain, N/V, fever, chills, dysuria or any other complaints. All remainder ROS negative.     MEDICATIONS  (STANDING):  albuterol/ipratropium (CFC free) Inhaler. 2 Puff(s) Inhalation four times a day  cloZAPine 200 milliGRAM(s) Oral at bedtime  cloZAPine 25 milliGRAM(s) Oral at bedtime  doxycycline hyclate Capsule 100 milliGRAM(s) Oral every 12 hours  enoxaparin Injectable 40 milliGRAM(s) SubCutaneous daily  guaiFENesin  milliGRAM(s) Oral every 12 hours  losartan 100 milliGRAM(s) Oral at bedtime  valproic  acid Syrup 1000 milliGRAM(s) Oral at bedtime    MEDICATIONS  (PRN):  melatonin 3 milliGRAM(s) Oral at bedtime PRN Sleep      I&O's Summary      PHYSICAL EXAM:  Vital Signs Last 24 Hrs  T(C): 37.1 (28 Dec 2021 12:43), Max: 37.1 (28 Dec 2021 02:45)  T(F): 98.8 (28 Dec 2021 12:43), Max: 98.8 (28 Dec 2021 02:45)  HR: 93 (28 Dec 2021 12:43) (80 - 93)  BP: 140/89 (28 Dec 2021 12:43) (140/89 - 155/72)  BP(mean): --  RR: 20 (28 Dec 2021 12:43) (20 - 20)  SpO2: 87% (28 Dec 2021 12:43) (87% - 99%)    GENERAL: Morbid Obesity, sitting in bed, refusing NC  HEAD:  Atraumatic, Normocephalic  EYES: conjunctiva and sclera clear  NECK: Supple, No JVD, Normal thyroid  NERVOUS SYSTEM: Motor Strength 5/5 B/L upper and lower extremities  CHEST/LUNG: CTA bilaterally; No rales, rhonchi, wheezing, or rubs  HEART: Regular rate and rhythm; No murmurs, rubs, or gallops. + LE edema b/l  ABDOMEN: Obese, Soft, Nontender, Nondistended; Bowel sounds present  EXTREMITIES:  2+ Peripheral Pulses, No clubbing, cyanosis, or edema  RIGHT FOOT- inner ankle- 2 areas of abrasion with healing scab formation, increased erythema noted, no TTP/drainage    LABS:                        11.3   10.66 )-----------( 252      ( 28 Dec 2021 03:29 )             35.2     12-28    138  |  98  |  7.4<L>  ----------------------------<  186<H>  3.7   |  27.0  |  0.39<L>    Ca    8.5<L>      28 Dec 2021 03:29  Phos  4.2     12-28  Mg     2.0     12-28    TPro  6.8  /  Alb  3.6  /  TBili  0.5  /  DBili  x   /  AST  43<H>  /  ALT  55<H>  /  AlkPhos  74  12-28              CAPILLARY BLOOD GLUCOSE          RADIOLOGY & ADDITIONAL TESTS:  Results Reviewed:   Imaging Personally Reviewed:  Electrocardiogram Personally Reviewed:

## 2021-12-29 DIAGNOSIS — R46.89 OTHER SYMPTOMS AND SIGNS INVOLVING APPEARANCE AND BEHAVIOR: ICD-10-CM

## 2021-12-29 DIAGNOSIS — F20.9 SCHIZOPHRENIA, UNSPECIFIED: ICD-10-CM

## 2021-12-29 LAB
ALBUMIN SERPL ELPH-MCNC: 3.6 G/DL — SIGNIFICANT CHANGE UP (ref 3.3–5.2)
ALP SERPL-CCNC: 65 U/L — SIGNIFICANT CHANGE UP (ref 40–120)
ALT FLD-CCNC: 44 U/L — HIGH
ANION GAP SERPL CALC-SCNC: 9 MMOL/L — SIGNIFICANT CHANGE UP (ref 5–17)
AST SERPL-CCNC: 31 U/L — SIGNIFICANT CHANGE UP
B PERT DNA SPEC QL NAA+PROBE: SIGNIFICANT CHANGE UP
BASOPHILS # BLD AUTO: 0.04 K/UL — SIGNIFICANT CHANGE UP (ref 0–0.2)
BASOPHILS NFR BLD AUTO: 0.3 % — SIGNIFICANT CHANGE UP (ref 0–2)
BILIRUB SERPL-MCNC: 0.4 MG/DL — SIGNIFICANT CHANGE UP (ref 0.4–2)
BUN SERPL-MCNC: 10.4 MG/DL — SIGNIFICANT CHANGE UP (ref 8–20)
C PNEUM DNA SPEC QL NAA+PROBE: SIGNIFICANT CHANGE UP
CALCIUM SERPL-MCNC: 8.4 MG/DL — LOW (ref 8.6–10.2)
CHLORIDE SERPL-SCNC: 97 MMOL/L — LOW (ref 98–107)
CO2 SERPL-SCNC: 27 MMOL/L — SIGNIFICANT CHANGE UP (ref 22–29)
CREAT SERPL-MCNC: 0.39 MG/DL — LOW (ref 0.5–1.3)
CULTURE RESULTS: SIGNIFICANT CHANGE UP
CULTURE RESULTS: SIGNIFICANT CHANGE UP
EOSINOPHIL # BLD AUTO: 0.1 K/UL — SIGNIFICANT CHANGE UP (ref 0–0.5)
EOSINOPHIL NFR BLD AUTO: 0.9 % — SIGNIFICANT CHANGE UP (ref 0–6)
FLUAV H1 2009 PAND RNA SPEC QL NAA+PROBE: SIGNIFICANT CHANGE UP
FLUAV H1 RNA SPEC QL NAA+PROBE: SIGNIFICANT CHANGE UP
FLUAV H3 RNA SPEC QL NAA+PROBE: SIGNIFICANT CHANGE UP
FLUAV SUBTYP SPEC NAA+PROBE: SIGNIFICANT CHANGE UP
FLUBV RNA SPEC QL NAA+PROBE: SIGNIFICANT CHANGE UP
GAS PNL BLDA: SIGNIFICANT CHANGE UP
GLUCOSE SERPL-MCNC: 212 MG/DL — HIGH (ref 70–99)
HADV DNA SPEC QL NAA+PROBE: SIGNIFICANT CHANGE UP
HCOV PNL SPEC NAA+PROBE: SIGNIFICANT CHANGE UP
HCT VFR BLD CALC: 33.5 % — LOW (ref 39–50)
HGB BLD-MCNC: 10.6 G/DL — LOW (ref 13–17)
HMPV RNA SPEC QL NAA+PROBE: SIGNIFICANT CHANGE UP
HPIV1 RNA SPEC QL NAA+PROBE: SIGNIFICANT CHANGE UP
HPIV2 RNA SPEC QL NAA+PROBE: SIGNIFICANT CHANGE UP
HPIV3 RNA SPEC QL NAA+PROBE: SIGNIFICANT CHANGE UP
HPIV4 RNA SPEC QL NAA+PROBE: SIGNIFICANT CHANGE UP
IMM GRANULOCYTES NFR BLD AUTO: 1.2 % — SIGNIFICANT CHANGE UP (ref 0–1.5)
LYMPHOCYTES # BLD AUTO: 1.09 K/UL — SIGNIFICANT CHANGE UP (ref 1–3.3)
LYMPHOCYTES # BLD AUTO: 9.3 % — LOW (ref 13–44)
MAGNESIUM SERPL-MCNC: 2 MG/DL — SIGNIFICANT CHANGE UP (ref 1.8–2.6)
MCHC RBC-ENTMCNC: 28.1 PG — SIGNIFICANT CHANGE UP (ref 27–34)
MCHC RBC-ENTMCNC: 31.6 GM/DL — LOW (ref 32–36)
MCV RBC AUTO: 88.9 FL — SIGNIFICANT CHANGE UP (ref 80–100)
MONOCYTES # BLD AUTO: 1.08 K/UL — HIGH (ref 0–0.9)
MONOCYTES NFR BLD AUTO: 9.3 % — SIGNIFICANT CHANGE UP (ref 2–14)
NEUTROPHILS # BLD AUTO: 9.22 K/UL — HIGH (ref 1.8–7.4)
NEUTROPHILS NFR BLD AUTO: 79 % — HIGH (ref 43–77)
PHOSPHATE SERPL-MCNC: 5 MG/DL — HIGH (ref 2.4–4.7)
PLATELET # BLD AUTO: 249 K/UL — SIGNIFICANT CHANGE UP (ref 150–400)
POTASSIUM SERPL-MCNC: 4.1 MMOL/L — SIGNIFICANT CHANGE UP (ref 3.5–5.3)
POTASSIUM SERPL-SCNC: 4.1 MMOL/L — SIGNIFICANT CHANGE UP (ref 3.5–5.3)
PROT SERPL-MCNC: 6.3 G/DL — LOW (ref 6.6–8.7)
RAPID RVP RESULT: DETECTED
RBC # BLD: 3.77 M/UL — LOW (ref 4.2–5.8)
RBC # FLD: 14.4 % — SIGNIFICANT CHANGE UP (ref 10.3–14.5)
RV+EV RNA SPEC QL NAA+PROBE: SIGNIFICANT CHANGE UP
SARS-COV-2 RNA SPEC QL NAA+PROBE: DETECTED
SODIUM SERPL-SCNC: 133 MMOL/L — LOW (ref 135–145)
SPECIMEN SOURCE: SIGNIFICANT CHANGE UP
SPECIMEN SOURCE: SIGNIFICANT CHANGE UP
WBC # BLD: 11.67 K/UL — HIGH (ref 3.8–10.5)
WBC # FLD AUTO: 11.67 K/UL — HIGH (ref 3.8–10.5)

## 2021-12-29 PROCEDURE — 99232 SBSQ HOSP IP/OBS MODERATE 35: CPT

## 2021-12-29 PROCEDURE — 71045 X-RAY EXAM CHEST 1 VIEW: CPT | Mod: 26

## 2021-12-29 RX ORDER — REMDESIVIR 5 MG/ML
100 INJECTION INTRAVENOUS EVERY 24 HOURS
Refills: 0 | Status: COMPLETED | OUTPATIENT
Start: 2021-12-30 | End: 2022-01-02

## 2021-12-29 RX ORDER — HALOPERIDOL DECANOATE 100 MG/ML
5 INJECTION INTRAMUSCULAR EVERY 6 HOURS
Refills: 0 | Status: DISCONTINUED | OUTPATIENT
Start: 2021-12-29 | End: 2022-01-06

## 2021-12-29 RX ORDER — NOREPINEPHRINE BITARTRATE/D5W 8 MG/250ML
0.05 PLASTIC BAG, INJECTION (ML) INTRAVENOUS
Qty: 8 | Refills: 0 | Status: DISCONTINUED | OUTPATIENT
Start: 2021-12-29 | End: 2021-12-30

## 2021-12-29 RX ORDER — AMPICILLIN SODIUM AND SULBACTAM SODIUM 250; 125 MG/ML; MG/ML
INJECTION, POWDER, FOR SUSPENSION INTRAMUSCULAR; INTRAVENOUS
Refills: 0 | Status: DISCONTINUED | OUTPATIENT
Start: 2021-12-29 | End: 2021-12-30

## 2021-12-29 RX ORDER — DEXMEDETOMIDINE HYDROCHLORIDE IN 0.9% SODIUM CHLORIDE 4 UG/ML
0.2 INJECTION INTRAVENOUS
Qty: 200 | Refills: 0 | Status: DISCONTINUED | OUTPATIENT
Start: 2021-12-29 | End: 2021-12-30

## 2021-12-29 RX ORDER — DIAZEPAM 5 MG
10 TABLET ORAL ONCE
Refills: 0 | Status: DISCONTINUED | OUTPATIENT
Start: 2021-12-29 | End: 2021-12-29

## 2021-12-29 RX ORDER — DIPHENHYDRAMINE HCL 50 MG
50 CAPSULE ORAL ONCE
Refills: 0 | Status: COMPLETED | OUTPATIENT
Start: 2021-12-29 | End: 2021-12-29

## 2021-12-29 RX ORDER — IPRATROPIUM/ALBUTEROL SULFATE 18-103MCG
3 AEROSOL WITH ADAPTER (GRAM) INHALATION EVERY 6 HOURS
Refills: 0 | Status: DISCONTINUED | OUTPATIENT
Start: 2021-12-29 | End: 2021-12-30

## 2021-12-29 RX ORDER — DEXMEDETOMIDINE HYDROCHLORIDE IN 0.9% SODIUM CHLORIDE 4 UG/ML
0.02 INJECTION INTRAVENOUS
Qty: 200 | Refills: 0 | Status: DISCONTINUED | OUTPATIENT
Start: 2021-12-29 | End: 2021-12-29

## 2021-12-29 RX ORDER — AMPICILLIN SODIUM AND SULBACTAM SODIUM 250; 125 MG/ML; MG/ML
1.5 INJECTION, POWDER, FOR SUSPENSION INTRAMUSCULAR; INTRAVENOUS ONCE
Refills: 0 | Status: COMPLETED | OUTPATIENT
Start: 2021-12-29 | End: 2021-12-29

## 2021-12-29 RX ORDER — CHLORHEXIDINE GLUCONATE 213 G/1000ML
1 SOLUTION TOPICAL
Refills: 0 | Status: DISCONTINUED | OUTPATIENT
Start: 2021-12-29 | End: 2022-01-06

## 2021-12-29 RX ORDER — OLANZAPINE 15 MG/1
5 TABLET, FILM COATED ORAL EVERY 6 HOURS
Refills: 0 | Status: DISCONTINUED | OUTPATIENT
Start: 2021-12-29 | End: 2022-01-05

## 2021-12-29 RX ORDER — SODIUM CHLORIDE 9 MG/ML
1000 INJECTION, SOLUTION INTRAVENOUS ONCE
Refills: 0 | Status: COMPLETED | OUTPATIENT
Start: 2021-12-29 | End: 2021-12-29

## 2021-12-29 RX ORDER — REMDESIVIR 5 MG/ML
INJECTION INTRAVENOUS
Refills: 0 | Status: COMPLETED | OUTPATIENT
Start: 2021-12-29 | End: 2022-01-02

## 2021-12-29 RX ORDER — MORPHINE SULFATE 50 MG/1
2 CAPSULE, EXTENDED RELEASE ORAL EVERY 4 HOURS
Refills: 0 | Status: DISCONTINUED | OUTPATIENT
Start: 2021-12-29 | End: 2021-12-29

## 2021-12-29 RX ORDER — DEXMEDETOMIDINE HYDROCHLORIDE IN 0.9% SODIUM CHLORIDE 4 UG/ML
0.01 INJECTION INTRAVENOUS
Qty: 200 | Refills: 0 | Status: DISCONTINUED | OUTPATIENT
Start: 2021-12-29 | End: 2021-12-29

## 2021-12-29 RX ORDER — REMDESIVIR 5 MG/ML
200 INJECTION INTRAVENOUS EVERY 24 HOURS
Refills: 0 | Status: COMPLETED | OUTPATIENT
Start: 2021-12-29 | End: 2021-12-29

## 2021-12-29 RX ORDER — AMPICILLIN SODIUM AND SULBACTAM SODIUM 250; 125 MG/ML; MG/ML
1.5 INJECTION, POWDER, FOR SUSPENSION INTRAMUSCULAR; INTRAVENOUS EVERY 6 HOURS
Refills: 0 | Status: DISCONTINUED | OUTPATIENT
Start: 2021-12-29 | End: 2021-12-30

## 2021-12-29 RX ORDER — DEXAMETHASONE 0.5 MG/5ML
10 ELIXIR ORAL DAILY
Refills: 0 | Status: DISCONTINUED | OUTPATIENT
Start: 2021-12-29 | End: 2022-01-06

## 2021-12-29 RX ADMIN — Medication 110 MILLIGRAM(S): at 17:18

## 2021-12-29 RX ADMIN — Medication 10 MILLIGRAM(S): at 01:15

## 2021-12-29 RX ADMIN — Medication 3 MILLILITER(S): at 05:45

## 2021-12-29 RX ADMIN — Medication 10 MILLIGRAM(S): at 02:55

## 2021-12-29 RX ADMIN — Medication 3 MILLILITER(S): at 09:00

## 2021-12-29 RX ADMIN — Medication 110 MILLIGRAM(S): at 06:32

## 2021-12-29 RX ADMIN — OLANZAPINE 5 MILLIGRAM(S): 15 TABLET, FILM COATED ORAL at 11:43

## 2021-12-29 RX ADMIN — ENOXAPARIN SODIUM 40 MILLIGRAM(S): 100 INJECTION SUBCUTANEOUS at 11:43

## 2021-12-29 RX ADMIN — REMDESIVIR 500 MILLIGRAM(S): 5 INJECTION INTRAVENOUS at 22:42

## 2021-12-29 RX ADMIN — OLANZAPINE 5 MILLIGRAM(S): 15 TABLET, FILM COATED ORAL at 17:18

## 2021-12-29 RX ADMIN — AMPICILLIN SODIUM AND SULBACTAM SODIUM 100 GRAM(S): 250; 125 INJECTION, POWDER, FOR SUSPENSION INTRAMUSCULAR; INTRAVENOUS at 17:19

## 2021-12-29 RX ADMIN — Medication 102 MILLIGRAM(S): at 17:44

## 2021-12-29 RX ADMIN — Medication 50 MILLIGRAM(S): at 02:35

## 2021-12-29 RX ADMIN — Medication 11.3 MICROGRAM(S)/KG/MIN: at 06:45

## 2021-12-29 RX ADMIN — DEXMEDETOMIDINE HYDROCHLORIDE IN 0.9% SODIUM CHLORIDE 6 MICROGRAM(S)/KG/HR: 4 INJECTION INTRAVENOUS at 10:59

## 2021-12-29 RX ADMIN — DEXMEDETOMIDINE HYDROCHLORIDE IN 0.9% SODIUM CHLORIDE 6 MICROGRAM(S)/KG/HR: 4 INJECTION INTRAVENOUS at 03:53

## 2021-12-29 RX ADMIN — Medication 3 MILLILITER(S): at 15:02

## 2021-12-29 RX ADMIN — SODIUM CHLORIDE 2000 MILLILITER(S): 9 INJECTION, SOLUTION INTRAVENOUS at 05:15

## 2021-12-29 RX ADMIN — Medication 2 MILLIGRAM(S): at 02:35

## 2021-12-29 RX ADMIN — OLANZAPINE 5 MILLIGRAM(S): 15 TABLET, FILM COATED ORAL at 06:32

## 2021-12-29 RX ADMIN — Medication 40 MILLIGRAM(S): at 06:32

## 2021-12-29 RX ADMIN — HALOPERIDOL DECANOATE 5 MILLIGRAM(S): 100 INJECTION INTRAMUSCULAR at 01:02

## 2021-12-29 RX ADMIN — DEXMEDETOMIDINE HYDROCHLORIDE IN 0.9% SODIUM CHLORIDE 6 MICROGRAM(S)/KG/HR: 4 INJECTION INTRAVENOUS at 09:41

## 2021-12-29 RX ADMIN — Medication 40 MILLIGRAM(S): at 14:31

## 2021-12-29 NOTE — ED ADULT NURSE REASSESSMENT NOTE - NS ED NURSE REASSESS COMMENT FT1
on revital, BP not reading. palp upper 50s systoplic,provider notified. new order for bolus and levo, coming to assess.

## 2021-12-29 NOTE — CHART NOTE - NSCHARTNOTEFT_GEN_A_CORE
Medicine PA-   Cd @ 2305 for pt that continues to be extremely agitated, yelling and swinging at staff, security cd. Pt was a code gray 1 hr ago and given zyprexa 5mg IM but it isn't helping, pt has no IV because he keeps pulling them out and refusing. Pt hx Schizo, DM, Obesity, HTN was sent in for right foot wound, with incidental finding of PNA ? RUL nodule. Pt given valium 10mg IM @ 2315 and an additional 10mg @ 2345, he has calmed down and is having a snack now, on 1:1 observation, a dose of haldol 5mg IM is pending should pt need it. Call PA if pt become agitated again, will consider ICU consult if recurs.
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
## 2021-12-29 NOTE — PROGRESS NOTE ADULT - PROBLEM SELECTOR PLAN 2
Continue with current medication regimen  Follow up with patient after sedation for further evaluation regarding capacity

## 2021-12-29 NOTE — ED ADULT NURSE REASSESSMENT NOTE - NSIMPLEMENTINTERV_GEN_ALL_ED
Implemented All Fall Risk Interventions:  [place] to call system. Call bell, personal items and telephone within reach. Instruct patient to call for assistance. Room bathroom lighting operational. Non-slip footwear when patient is off stretcher. Physically safe environment: no spills, clutter or unnecessary equipment. Stretcher in lowest position, wheels locked, appropriate side rails in place. Provide visual cue, wrist band, yellow gown, etc. Monitor gait and stability. Monitor for mental status changes and reorient to person, place, and time. Review medications for side effects contributing to fall risk. Reinforce activity limits and safety measures with patient and family.

## 2021-12-29 NOTE — ED ADULT NURSE REASSESSMENT NOTE - NS ED NURSE REASSESS COMMENT FT1
assumed care of patient, currently on precedex and norepi drip.  vitals stable.  PT is currently calm and resting quietly.  bipap in use 02 sat at 99%.  currently on 1:1 observation.  will continue to monitor.

## 2021-12-29 NOTE — CONSULT NOTE ADULT - SUBJECTIVE AND OBJECTIVE BOX
TIFFANY HERCULES  MRN-[id number]  Patient is a 56y old  Male who presents with a chief complaint of foot wound (28 Dec 2021 16:57)    HPI:  56 yr old male from [facility] [place] with medical history of Schizo, DM, Obesity, HTN was sent in for right foot wound, which he states was noted yesterday. He does not recall trauma. However in ED, the wound appears > 1 day old and incidentally noted pulm infiltrates and nodule on imaging. Denies fevers, dyspnea, cough. States his wheezing/ congestion is regular and he takes inhalers.     SH- per records, no habits  FH- none available   (25 Dec 2021 11:59)      Events in last 24 hours:     REVIEW OF SYSTEMS:    Gen: No fever  EYES/ENT: No visual changes;  No vertigo or throat pain   NECK: No pain   RES:  No shortness of breath or Cough  CARD: No chest pain   GI: No abdominal pain  : No dysuria  NEURO: No weakness  SKIN: No itching, rashes     Physical Exam:  Vital Signs Last 24 Hrs  T(C): 37 (28 Dec 2021 19:04), Max: 37.1 (28 Dec 2021 04:42)  T(F): 98.6 (28 Dec 2021 19:04), Max: 98.8 (28 Dec 2021 12:43)  HR: 100 (28 Dec 2021 19:04) (80 - 100)  BP: 170/94 (28 Dec 2021 19:04) (140/89 - 170/94)  BP(mean): --  RR: 20 (28 Dec 2021 19:04) (20 - 20)  SpO2: 94% (28 Dec 2021 19:04) (84% - 99%)    Gen:  Awake, alert   CNS: non focal 	  Neck: no JVD  RES : clear , no wheezing                      CVS: Regular  rhythm. Normal S1/S2  Abd: Soft, non-distended. Bowel sounds present.  Skin: No rash.  Ext:  no edema    ============================I/O===========================   I&O's Detail    ============================ LABS =========================                        11.3   10.66 )-----------( 252      ( 28 Dec 2021 03:29 )             35.2     12-28    138  |  98  |  7.4<L>  ----------------------------<  186<H>  3.7   |  27.0  |  0.39<L>    Ca    8.5<L>      28 Dec 2021 03:29  Phos  4.2     12-28  Mg     2.0     12-28    TPro  6.8  /  Alb  3.6  /  TBili  0.5  /  DBili  x   /  AST  43<H>  /  ALT  55<H>  /  AlkPhos  74  12-28    LIVER FUNCTIONS - ( 28 Dec 2021 03:29 )  Alb: 3.6 g/dL / Pro: 6.8 g/dL / ALK PHOS: 74 U/L / ALT: 55 U/L / AST: 43 U/L / GGT: x             ABG - ( 29 Dec 2021 04:17 )  pH, Arterial: 7.330 pH, Blood: x     /  pCO2: 60    /  pO2: 89    / HCO3: 32    / Base Excess: 5.7   /  SaO2: 97.8                ======================Micro/Rad/Cardio=================  Culture: Reviewed   CXR: Reviewed  Echo:Reviewed  ======================================================  PAST MEDICAL & SURGICAL HISTORY:  Schizophrenia    No significant past surgical history        RESTRAINTS  ======================================================  I have evaluated this patient and have determined that restraints are warranted to optimize medical care. Patient was assessed for current physical and psychological risk factors as well as special needs. There are no medical conditions or limitations that would place this patient at risk while in restraints.  Type of restraint: bilat is wrist. The behavioral criteria for discontinuation of restraint:   -to prevent pulling at ET tube or other lines in place.   -combative aggressive behavior causing harm to pt themselves and staff   -The Attending was notified about the restraints     ====================ASSESSMENT ==============  1. Acute Hypoxic RF   2. R LE wound and cellulitis   3. Acute psychosis and delirium   4. Metabolic encephalopathy    Plan:  ====================== NEUROLOGY=====================  -sedated on precedex for compliance on AVAPS   -Pt on home medsL clozapine   -PRN morphine and haldol for restlessness combativeness and tachypnea     cloZAPine 200 milliGRAM(s) Oral at bedtime  cloZAPine 25 milliGRAM(s) Oral at bedtime  dexMEDEtomidine Infusion 0.2 MICROgram(s)/kG/Hr (6 mL/Hr) IV Continuous <Continuous>  haloperidol    Injectable 5 milliGRAM(s) IV Push every 6 hours PRN restelssness and agitation  melatonin 3 milliGRAM(s) Oral at bedtime PRN Sleep  morphine  - Injectable 2 milliGRAM(s) IV Push every 4 hours PRN tachypnea/anxiousness  valproic  acid Syrup 1000 milliGRAM(s) Oral at bedtime    ==================== RESPIRATORY======================  Mechanical Ventilation:    Will plan to transition pt from %   albuterol/ipratropium (CFC free) Inhaler. 2 Puff(s) Inhalation four times a day  albuterol/ipratropium for Nebulization 3 milliLiter(s) Nebulizer every 6 hours  guaiFENesin  milliGRAM(s) Oral every 12 hours    ====================CARDIOVASCULAR==================  losartan 100 milliGRAM(s) Oral at bedtime    ===================HEMATOLOGIC/ONC ===================  enoxaparin Injectable 40 milliGRAM(s) SubCutaneous daily    ===================== RENAL =========================  Continue monitoring urine output    ==================== GASTROINTESTINAL===================    =======================    ENDOCRINE  =====================  methylPREDNISolone sodium succinate Injectable 40 milliGRAM(s) IV Push every 8 hours    ========================INFECTIOUS DISEASE================  doxycycline IVPB 100 milliGRAM(s) IV Intermittent every 12 hours      Patient requires continuous monitoring with bedside rhythm monitoring, pulse oximetry, ventilator/ bipap  monitoring and intermittent blood gas analysis.    Care plan discussed with ICU care team.    Patient remained critical; required more than usual care; I have spent 35 minutes providing non-routine care, revaluated multiple times during the day.     TIFFANY HERCULES  MRN-[id number]  Patient is a 56y old  Male who presents with a chief complaint of foot wound (28 Dec 2021 16:57)    HPI:  56 yr old male from [facility] on the [place] grounds with medical history of Schizo, DM, Obesity, HTN was sent in for right foot wound, which he states was noted yesterday. He does not recall trauma. However in ED, the wound appears > 1 day old and incidentally noted pulm infiltrates and nodule on imaging. Denies fevers, dyspnea, cough. States his wheezing/ congestion is regular and he takes inhalers.     SH- per records, no habits  FH- none available   (25 Dec 2021 11:59)      Events in last 24 hours:   ICU c/s called after pt was combative, restless, anxious all night, swinging and hitting staff, pulling out IV's and pulling off O2. Pt became hypoxic and more restless/delirious. Pt was heavily medicated with valium, haldol, ativan, benadryl, and zyprexa. Pt still combative and a danger to self and staff.     Pt was started on Precedex gtt, so able to tolerate AVAPS and control breathing and treat hypoxia adequately   IN ED pt started to become acutely hypotensive and was given 1L bolus and started on levophed gtt- actively titrating down over the course of the night     REVIEW OF SYSTEMS:    ROS unable to be obtained pt confused and lethargic     Physical Exam:  Vital Signs Last 24 Hrs  T(C): 37 (28 Dec 2021 19:04), Max: 37.1 (28 Dec 2021 04:42)  T(F): 98.6 (28 Dec 2021 19:04), Max: 98.8 (28 Dec 2021 12:43)  HR: 100 (28 Dec 2021 19:04) (80 - 100)  BP: 170/94 (28 Dec 2021 19:04) (140/89 - 170/94)  BP(mean): --  RR: 20 (28 Dec 2021 19:04) (20 - 20)  SpO2: 94% (28 Dec 2021 19:04) (84% - 99%)    Gen:  lethargic obese male lying in bed   CNS: non focal 5/5 motor function   Neck: no JVD  RES : + expiratory wheezing, thiroughout                  CVS: Regular  rhythm. Normal S1/S2  Abd: Soft, non-distended. Bowel sounds present.  Skin: + chronic venous stasis   Ext:  no + edema and R LE cellulitis + necrotic ulceration    ============================I/O===========================   I&O's Detail    ============================ LABS =========================                        11.3   10.66 )-----------( 252      ( 28 Dec 2021 03:29 )             35.2     12-28    138  |  98  |  7.4<L>  ----------------------------<  186<H>  3.7   |  27.0  |  0.39<L>    Ca    8.5<L>      28 Dec 2021 03:29  Phos  4.2     12-28  Mg     2.0     12-28    TPro  6.8  /  Alb  3.6  /  TBili  0.5  /  DBili  x   /  AST  43<H>  /  ALT  55<H>  /  AlkPhos  74  12-28    LIVER FUNCTIONS - ( 28 Dec 2021 03:29 )  Alb: 3.6 g/dL / Pro: 6.8 g/dL / ALK PHOS: 74 U/L / ALT: 55 U/L / AST: 43 U/L / GGT: x             ABG - ( 29 Dec 2021 04:17 )  pH, Arterial: 7.330 pH, Blood: x     /  pCO2: 60    /  pO2: 89    / HCO3: 32    / Base Excess: 5.7   /  SaO2: 97.8                ======================Micro/Rad/Cardio=================  Culture: Culture - Blood (12.24.21 @ 22:36)    Specimen Source: .Blood Blood-Peripheral    Culture Results:   No growth at 48 hours    Culture - Urine (12.25.21 @ 06:38)    Specimen Source: Clean Catch Clean Catch (Midstream)    Culture Results:   <10,000 CFU/mL Normal Urogenital [place]      CXR: < from: CT Angio Chest PE Protocol w/ IV Cont (12.25.21 @ 03:59) >  IMPRESSION:    1. No pulmonary embolism.  2. 3.9 x 2.8 cm nodular right upper lobe opacity highly suspicious for   malignancy. Associated right hilar adenopathy.  2. Hazy ground glass opacities in the left upper lobe and tree-in-bud   nodular ground glass opacities in the left lower lobe, likely infectious   in etiology.        --- End of Report ---    < end of copied text >        ======================================================  PAST MEDICAL & SURGICAL HISTORY:  Schizophrenia    No significant past surgical history        RESTRAINTS  ======================================================  I have evaluated this patient and have determined that restraints are warranted to optimize medical care. Patient was assessed for current physical and psychological risk factors as well as special needs. There are no medical conditions or limitations that would place this patient at risk while in restraints.  Type of restraint: bilat is wrist. The behavioral criteria for discontinuation of restraint:   -to prevent pulling at ET tube or other lines in place.   -combative aggressive behavior causing harm to pt themselves and staff   -The Attending was notified about the restraints     ====================ASSESSMENT ==============  1. Acute Hypoxic RF   2. R LE wound and cellulitis   3. Acute psychosis and delirium   4. Metabolic encephalopathy  5. Distributive vs. septic shock     Plan:  ====================== NEUROLOGY=====================  -sedated on precedex for compliance on AVAPS   -Pt on home medsL clozapine   -PRN morphine and haldol for restlessness combativeness and tachypnea     cloZAPine 200 milliGRAM(s) Oral at bedtime  cloZAPine 25 milliGRAM(s) Oral at bedtime  dexMEDEtomidine Infusion 0.2 MICROgram(s)/kG/Hr (6 mL/Hr) IV Continuous <Continuous>  haloperidol    Injectable 5 milliGRAM(s) IV Push every 6 hours PRN restelssness and agitation  melatonin 3 milliGRAM(s) Oral at bedtime PRN Sleep  morphine  - Injectable 2 milliGRAM(s) IV Push every 4 hours PRN tachypnea/anxiousness  valproic  acid Syrup 1000 milliGRAM(s) Oral at bedtime    ==================== RESPIRATORY======================  Will plan to transition pt from % to AVAPS for work of breathig   If pt fails AVAPS will have low threshold to intubate   -wheezing on exam, will start on duoneb  and solumedrol q8h     albuterol/ipratropium (CFC free) Inhaler. 2 Puff(s) Inhalation four times a day  albuterol/ipratropium for Nebulization 3 milliLiter(s) Nebulizer every 6 hours  guaiFENesin  milliGRAM(s) Oral every 12 hours    ====================CARDIOVASCULAR==================  -Pt became acutely hypotensive with SBP in 50's, was started on levophed gtt to maintain MAP>65   -Pt was given 1L IVF bolus   ===================HEMATOLOGIC/ONC ===================  enoxaparin Injectable 40 milliGRAM(s) Subcutaneous daily - DVT ppx     ===================== RENAL =========================  -Check repeat labs and monitor renal function trend  -Encourage PO intake as tolerated  -IVF hydration   -monitor and replace electrolytes as needed   -Monitor input and output.    ==================== GASTROINTESTINAL===================  -NPO while on AVAPS       ========================INFECTIOUS DISEASE================  -Pt started on DOxy for L ed  doxycycline IVPB 100 milliGRAM(s) IV Intermittent every 12 hours        Critical Care time: 40 mins assessing presenting problems of acute illness that poses high probability of life threatening deterioration or end organ damage/dysfunction.  Medical decision making including Initiating plan of care, reviewing data, reviewing radiology, direct patient bedside evaluation and interpretation of vital signs, any necessary ventilator management , discussion with multidisciplinary team, discussing goals of care with patient/family, all non inclusive of procedures    TIFFANY HERCULES  MRN-[id number]  Patient is a 56y old  Male who presents with a chief complaint of foot wound (28 Dec 2021 16:57)    HPI:  56 [facility] [place] [facility] with medical history of Schizo, DM, Obesity, HTN was sent in for right foot wound, which he states was noted yesterday. He does not recall trauma. However in ED, the wound appears > 1 day old and incidentally noted pulm infiltrates and nodule on imaging. Denies fevers, dyspnea, cough. States his wheezing/ congestion is regular and he takes inhalers.     SH- per records, no habits  FH- none available   (25 Dec 2021 11:59)      Events in last 24 hours:   ICU c/s called after pt was combative, restless, anxious all night, swinging and hitting staff, pulling out IV's and pulling off O2. Pt became hypoxic and more restless/delirious. Pt was heavily medicated with valium, haldol, ativan, benadryl, and zyprexa. Pt still combative and a danger to self and staff.     Pt was started on Precedex gtt, so able to tolerate AVAPS and control breathing and treat hypoxia adequately   IN ED pt started to become acutely hypotensive and was given 1L bolus and started on levophed gtt- actively titrating down over the course of the night     REVIEW OF SYSTEMS:    ROS unable to be obtained pt confused and lethargic     Physical Exam:  Vital Signs Last 24 Hrs  T(C): 37 (28 Dec 2021 19:04), Max: 37.1 (28 Dec 2021 04:42)  T(F): 98.6 (28 Dec 2021 19:04), Max: 98.8 (28 Dec 2021 12:43)  HR: 100 (28 Dec 2021 19:04) (80 - 100)  BP: 170/94 (28 Dec 2021 19:04) (140/89 - 170/94)  BP(mean): --  RR: 20 (28 Dec 2021 19:04) (20 - 20)  SpO2: 94% (28 Dec 2021 19:04) (84% - 99%)    Gen:  lethargic obese male lying in bed   CNS: non focal 5/5 motor function   Neck: no JVD  RES : + expiratory wheezing, thiroughout                  CVS: Regular  rhythm. Normal S1/S2  Abd: Soft, non-distended. Bowel sounds present.  Skin: + chronic venous stasis   Ext:  no + edema and R LE cellulitis + necrotic ulceration    ============================I/O===========================   I&O's Detail    ============================ LABS =========================                        11.3   10.66 )-----------( 252      ( 28 Dec 2021 03:29 )             35.2     12-28    138  |  98  |  7.4<L>  ----------------------------<  186<H>  3.7   |  27.0  |  0.39<L>    Ca    8.5<L>      28 Dec 2021 03:29  Phos  4.2     12-28  Mg     2.0     12-28    TPro  6.8  /  Alb  3.6  /  TBili  0.5  /  DBili  x   /  AST  43<H>  /  ALT  55<H>  /  AlkPhos  74  12-28    LIVER FUNCTIONS - ( 28 Dec 2021 03:29 )  Alb: 3.6 g/dL / Pro: 6.8 g/dL / ALK PHOS: 74 U/L / ALT: 55 U/L / AST: 43 U/L / GGT: x             ABG - ( 29 Dec 2021 04:17 )  pH, Arterial: 7.330 pH, Blood: x     /  pCO2: 60    /  pO2: 89    / HCO3: 32    / Base Excess: 5.7   /  SaO2: 97.8                ======================Micro/Rad/Cardio=================  Culture: Culture - Blood (12.24.21 @ 22:36)    Specimen Source: .Blood Blood-Peripheral    Culture Results:   No growth at 48 hours    Culture - Urine (12.25.21 @ 06:38)    Specimen Source: Clean Catch Clean Catch (Midstream)    Culture Results:   <10,000 CFU/mL Normal Urogenital [place]      CXR: < from: CT Angio Chest PE Protocol w/ IV Cont (12.25.21 @ 03:59) >  IMPRESSION:    1. No pulmonary embolism.  2. 3.9 x 2.8 cm nodular right upper lobe opacity highly suspicious for   malignancy. Associated right hilar adenopathy.  2. Hazy ground glass opacities in the left upper lobe and tree-in-bud   nodular ground glass opacities in the left lower lobe, likely infectious   in etiology.        --- End of Report ---    < end of copied text >        ======================================================  PAST MEDICAL & SURGICAL HISTORY:  Schizophrenia    No significant past surgical history        RESTRAINTS  ======================================================  I have evaluated this patient and have determined that restraints are warranted to optimize medical care. Patient was assessed for current physical and psychological risk factors as well as special needs. There are no medical conditions or limitations that would place this patient at risk while in restraints.  Type of restraint: bilat is wrist. The behavioral criteria for discontinuation of restraint:   -to prevent pulling at ET tube or other lines in place.   -combative aggressive behavior causing harm to pt themselves and staff   -The Attending was notified about the restraints     ====================ASSESSMENT ==============  1. Acute Hypoxic RF   2. R LE wound and cellulitis   3. Acute psychosis and delirium   4. Metabolic encephalopathy  5. Distributive vs. septic shock   6. COVID PNA     Plan:  ====================== NEUROLOGY=====================  -sedated on precedex for compliance on AVAPS   -Pt on home medsL clozapine   -PRN morphine and haldol for restlessness combativeness and tachypnea     cloZAPine 200 milliGRAM(s) Oral at bedtime  cloZAPine 25 milliGRAM(s) Oral at bedtime  dexMEDEtomidine Infusion 0.2 MICROgram(s)/kG/Hr (6 mL/Hr) IV Continuous <Continuous>  haloperidol    Injectable 5 milliGRAM(s) IV Push every 6 hours PRN restelssness and agitation  melatonin 3 milliGRAM(s) Oral at bedtime PRN Sleep  morphine  - Injectable 2 milliGRAM(s) IV Push every 4 hours PRN tachypnea/anxiousness  valproic  acid Syrup 1000 milliGRAM(s) Oral at bedtime    ==================== RESPIRATORY======================  Will plan to transition pt from % to AVAPS for work of breathig   If pt fails AVAPS will have low threshold to intubate   -wheezing on exam, will start on duoneb  and solumedrol q8h   -Pt later found to have + COVID    albuterol/ipratropium (CFC free) Inhaler. 2 Puff(s) Inhalation four times a day  albuterol/ipratropium for Nebulization 3 milliLiter(s) Nebulizer every 6 hours  guaiFENesin  milliGRAM(s) Oral every 12 hours    ====================CARDIOVASCULAR==================  -Pt became acutely hypotensive with SBP in 50's, was started on levophed gtt to maintain MAP>65   -Pt was given 1L IVF bolus   ===================HEMATOLOGIC/ONC ===================  enoxaparin Injectable 40 milliGRAM(s) Subcutaneous daily - DVT ppx     ===================== RENAL =========================  -Check repeat labs and monitor renal function trend  -Encourage PO intake as tolerated  -IVF hydration   -monitor and replace electrolytes as needed   -Monitor input and output.    ==================== GASTROINTESTINAL===================  -NPO while on AVAPS       ========================INFECTIOUS DISEASE================  -Pt started on DOxy for L ed  doxycycline IVPB 100 milliGRAM(s) IV Intermittent every 12 hours        Critical Care time: 40 mins assessing presenting problems of acute illness that poses high probability of life threatening deterioration or end organ damage/dysfunction.  Medical decision making including Initiating plan of care, reviewing data, reviewing radiology, direct patient bedside evaluation and interpretation of vital signs, any necessary ventilator management , discussion with multidisciplinary team, discussing goals of care with patient/family, all non inclusive of procedures

## 2021-12-29 NOTE — CHART NOTE - NSCHARTNOTEFT_GEN_A_CORE
Pt seen and examined at bedside with RN and security present for complaint by RN that pt is removing nasal cannula, O2 sats mid 70s. Continues to display aggression toward staff. Despite being on constant observation and receiving multiple IM injections over past few hours pt is still agitated, attempting to get out of bed, striking and lunging at staff. I personally attempted to redirect pt and ask him to wear oxygen and  however pt refused.      ROS unable to be obtained.    VITALS:  T(C): 37 (12-28-21 @ 19:04), Max: 37.1 (12-28-21 @ 02:45)  HR: 100 (12-28-21 @ 19:04) (80 - 100)  BP: 170/94 (12-28-21 @ 19:04) (140/89 - 170/94)  RR: 20 (12-28-21 @ 19:04) (20 - 20)  SpO2: 94% (12-28-21 @ 19:04) (84% - 99%)    PHYSICAL EXAM: Limited exam 2/2 aggression and safety concern    GENERAL: Morbidly obese, refusing NC, getting out of bed   HEAD:  Atraumatic   EYES: EOMI, conjunctiva and sclera clear  ENT: Moist mucous membranes  RESP: Belly breathing, tachypneic  MSK: FROM x 4 extremities         A/P: 56 yr old male from [facility] on the [place] grounds with medical history of Schizo, DM, Obesity, HTN was sent in for right foot wound, with incidental finding of PNA    Aggression and agitation impacting pt care and staff safety  -10mg IM valium given  -B/l wrist restraints  -50% VM placed  -Put back on   -ABG pending  -CXR pending  -Continues to refuse IV placement  -RN to notify of any acute change in pt status Pt seen and examined at bedside with RN and security present for complaint by RN that pt is removing nasal cannula, O2 sats mid 70s. Continues to display aggression toward staff. Despite being on constant observation and receiving multiple IM injections over past few hours pt is still agitated, attempting to get out of bed, striking and lunging at staff. I personally attempted to redirect pt and ask him to wear oxygen and  however pt refused.      ROS unable to be obtained.    VITALS:  T(C): 37 (12-28-21 @ 19:04), Max: 37.1 (12-28-21 @ 02:45)  HR: 100 (12-28-21 @ 19:04) (80 - 100)  BP: 170/94 (12-28-21 @ 19:04) (140/89 - 170/94)  RR: 20 (12-28-21 @ 19:04) (20 - 20)  SpO2: 94% (12-28-21 @ 19:04) (84% - 99%)    PHYSICAL EXAM: Limited exam 2/2 aggression and safety concern    GENERAL: Morbidly obese, refusing NC, getting out of bed   HEAD:  Atraumatic   EYES: EOMI, conjunctiva and sclera clear  ENT: Moist mucous membranes  RESP: Belly breathing, tachypneic  MSK: FROM x 4 extremities         A/P: 56 yr old male from [facility] on the [place] grounds with medical history of Schizo, DM, Obesity, HTN was sent in for right foot wound, with incidental finding of PNA    Aggression and agitation impacting pt care and staff safety  -10mg IM valium given  -B/l wrist restraints  -Continue 1:1   -50% VM placed  -Put back on   -ABG pending  -CXR pending  -Continues to refuse IV placement  -RN to notify of any acute change in pt status

## 2021-12-29 NOTE — CHART NOTE - NSCHARTNOTEFT_GEN_A_CORE
Ethics continues to follow. KAUR Milligan MD (Ethics Attending) spoke with EDDIE Richardson MD ( Attending) regarding capacity and his current diagnosis. Patient upgraded to MICU today.     A. Schwab PA (Ethics Fellow) investigating surrogate situation. In her conversation with KAUR Espinoza LCSW (MICU SW), per patient's CM, patient has been previously independent for the past 7 years. He has assistance but MAKES HIS OWN decisions and cooks for himself. This is NOT his baseline.     Discussion with MIHAELA Chen MD (MICU Attending) at end of day - patient now COVID19 positive. Reviewed OM status.     At present, patient remains without a surrogate. Two physicians can make major medical decisions. As patient falls under the auspices of [facility], ANY MOLST DIRECTIVES INCLUDING DNR AND DNI, MUST FOLLOW PROPER PROCEDURE AND BE REVIEWED WITHOUT OBJECTION (APPROVED) BY [facility] LEGAL SERVICES (MOLST CHECKLIST #4).     PATIENT MUST REMAIN FULL RESUSCITATION.     Consult in progress.    Sarah Milligan MD  Ethics Attending  [phone].

## 2021-12-30 LAB
ALBUMIN SERPL ELPH-MCNC: 3.4 G/DL — SIGNIFICANT CHANGE UP (ref 3.3–5.2)
ALBUMIN SERPL ELPH-MCNC: 3.4 G/DL — SIGNIFICANT CHANGE UP (ref 3.3–5.2)
ALP SERPL-CCNC: 64 U/L — SIGNIFICANT CHANGE UP (ref 40–120)
ALP SERPL-CCNC: 69 U/L — SIGNIFICANT CHANGE UP (ref 40–120)
ALT FLD-CCNC: 34 U/L — SIGNIFICANT CHANGE UP
ALT FLD-CCNC: 35 U/L — SIGNIFICANT CHANGE UP
ANION GAP SERPL CALC-SCNC: 8 MMOL/L — SIGNIFICANT CHANGE UP (ref 5–17)
AST SERPL-CCNC: 24 U/L — SIGNIFICANT CHANGE UP
AST SERPL-CCNC: 27 U/L — SIGNIFICANT CHANGE UP
BASOPHILS # BLD AUTO: 0.02 K/UL — SIGNIFICANT CHANGE UP (ref 0–0.2)
BASOPHILS NFR BLD AUTO: 0.1 % — SIGNIFICANT CHANGE UP (ref 0–2)
BILIRUB DIRECT SERPL-MCNC: 0.1 MG/DL — SIGNIFICANT CHANGE UP (ref 0–0.3)
BILIRUB INDIRECT FLD-MCNC: 0.2 MG/DL — SIGNIFICANT CHANGE UP (ref 0.2–1)
BILIRUB SERPL-MCNC: 0.3 MG/DL — LOW (ref 0.4–2)
BILIRUB SERPL-MCNC: 0.3 MG/DL — LOW (ref 0.4–2)
BUN SERPL-MCNC: 13.6 MG/DL — SIGNIFICANT CHANGE UP (ref 8–20)
CALCIUM SERPL-MCNC: 8.8 MG/DL — SIGNIFICANT CHANGE UP (ref 8.6–10.2)
CHLORIDE SERPL-SCNC: 102 MMOL/L — SIGNIFICANT CHANGE UP (ref 98–107)
CO2 SERPL-SCNC: 29 MMOL/L — SIGNIFICANT CHANGE UP (ref 22–29)
CREAT SERPL-MCNC: 0.37 MG/DL — LOW (ref 0.5–1.3)
EOSINOPHIL # BLD AUTO: 0 K/UL — SIGNIFICANT CHANGE UP (ref 0–0.5)
EOSINOPHIL NFR BLD AUTO: 0 % — SIGNIFICANT CHANGE UP (ref 0–6)
GLUCOSE SERPL-MCNC: 192 MG/DL — HIGH (ref 70–99)
HCT VFR BLD CALC: 34.4 % — LOW (ref 39–50)
HGB BLD-MCNC: 10.8 G/DL — LOW (ref 13–17)
IMM GRANULOCYTES NFR BLD AUTO: 0.5 % — SIGNIFICANT CHANGE UP (ref 0–1.5)
INR BLD: 1.23 RATIO — HIGH (ref 0.88–1.16)
LYMPHOCYTES # BLD AUTO: 0.98 K/UL — LOW (ref 1–3.3)
LYMPHOCYTES # BLD AUTO: 6.4 % — LOW (ref 13–44)
MAGNESIUM SERPL-MCNC: 2.3 MG/DL — SIGNIFICANT CHANGE UP (ref 1.6–2.6)
MCHC RBC-ENTMCNC: 27.9 PG — SIGNIFICANT CHANGE UP (ref 27–34)
MCHC RBC-ENTMCNC: 31.4 GM/DL — LOW (ref 32–36)
MCV RBC AUTO: 88.9 FL — SIGNIFICANT CHANGE UP (ref 80–100)
MONOCYTES # BLD AUTO: 0.97 K/UL — HIGH (ref 0–0.9)
MONOCYTES NFR BLD AUTO: 6.4 % — SIGNIFICANT CHANGE UP (ref 2–14)
NEUTROPHILS # BLD AUTO: 13.22 K/UL — HIGH (ref 1.8–7.4)
NEUTROPHILS NFR BLD AUTO: 86.6 % — HIGH (ref 43–77)
PHOSPHATE SERPL-MCNC: 4.3 MG/DL — SIGNIFICANT CHANGE UP (ref 2.4–4.7)
PLATELET # BLD AUTO: 258 K/UL — SIGNIFICANT CHANGE UP (ref 150–400)
POTASSIUM SERPL-MCNC: 4.7 MMOL/L — SIGNIFICANT CHANGE UP (ref 3.5–5.3)
POTASSIUM SERPL-SCNC: 4.7 MMOL/L — SIGNIFICANT CHANGE UP (ref 3.5–5.3)
PROT SERPL-MCNC: 6.6 G/DL — SIGNIFICANT CHANGE UP (ref 6.6–8.7)
PROT SERPL-MCNC: 6.7 G/DL — SIGNIFICANT CHANGE UP (ref 6.6–8.7)
PROTHROM AB SERPL-ACNC: 14.1 SEC — HIGH (ref 10.6–13.6)
RBC # BLD: 3.87 M/UL — LOW (ref 4.2–5.8)
RBC # FLD: 14.1 % — SIGNIFICANT CHANGE UP (ref 10.3–14.5)
SODIUM SERPL-SCNC: 139 MMOL/L — SIGNIFICANT CHANGE UP (ref 135–145)
WBC # BLD: 15.26 K/UL — HIGH (ref 3.8–10.5)
WBC # FLD AUTO: 15.26 K/UL — HIGH (ref 3.8–10.5)

## 2021-12-30 PROCEDURE — 93010 ELECTROCARDIOGRAM REPORT: CPT

## 2021-12-30 PROCEDURE — 99232 SBSQ HOSP IP/OBS MODERATE 35: CPT

## 2021-12-30 PROCEDURE — 99291 CRITICAL CARE FIRST HOUR: CPT

## 2021-12-30 PROCEDURE — 99233 SBSQ HOSP IP/OBS HIGH 50: CPT

## 2021-12-30 RX ORDER — VALPROIC ACID (AS SODIUM SALT) 250 MG/5ML
1000 SOLUTION, ORAL ORAL AT BEDTIME
Refills: 0 | Status: DISCONTINUED | OUTPATIENT
Start: 2021-12-30 | End: 2022-01-06

## 2021-12-30 RX ORDER — TOCILIZUMAB 20 MG/ML
800 INJECTION, SOLUTION, CONCENTRATE INTRAVENOUS ONCE
Refills: 0 | Status: DISCONTINUED | OUTPATIENT
Start: 2021-12-30 | End: 2021-12-30

## 2021-12-30 RX ORDER — TOCILIZUMAB 20 MG/ML
800 INJECTION, SOLUTION, CONCENTRATE INTRAVENOUS ONCE
Refills: 0 | Status: COMPLETED | OUTPATIENT
Start: 2021-12-30 | End: 2021-12-30

## 2021-12-30 RX ADMIN — Medication 110 MILLIGRAM(S): at 07:00

## 2021-12-30 RX ADMIN — LOSARTAN POTASSIUM 100 MILLIGRAM(S): 100 TABLET, FILM COATED ORAL at 22:12

## 2021-12-30 RX ADMIN — OLANZAPINE 5 MILLIGRAM(S): 15 TABLET, FILM COATED ORAL at 00:11

## 2021-12-30 RX ADMIN — AMPICILLIN SODIUM AND SULBACTAM SODIUM 100 GRAM(S): 250; 125 INJECTION, POWDER, FOR SUSPENSION INTRAMUSCULAR; INTRAVENOUS at 06:50

## 2021-12-30 RX ADMIN — ENOXAPARIN SODIUM 40 MILLIGRAM(S): 100 INJECTION SUBCUTANEOUS at 11:58

## 2021-12-30 RX ADMIN — OLANZAPINE 5 MILLIGRAM(S): 15 TABLET, FILM COATED ORAL at 06:52

## 2021-12-30 RX ADMIN — DEXMEDETOMIDINE HYDROCHLORIDE IN 0.9% SODIUM CHLORIDE 6 MICROGRAM(S)/KG/HR: 4 INJECTION INTRAVENOUS at 17:03

## 2021-12-30 RX ADMIN — Medication 3 MILLIGRAM(S): at 22:12

## 2021-12-30 RX ADMIN — DEXMEDETOMIDINE HYDROCHLORIDE IN 0.9% SODIUM CHLORIDE 6 MICROGRAM(S)/KG/HR: 4 INJECTION INTRAVENOUS at 13:54

## 2021-12-30 RX ADMIN — CLOZAPINE 200 MILLIGRAM(S): 150 TABLET, ORALLY DISINTEGRATING ORAL at 22:12

## 2021-12-30 RX ADMIN — REMDESIVIR 500 MILLIGRAM(S): 5 INJECTION INTRAVENOUS at 22:12

## 2021-12-30 RX ADMIN — TOCILIZUMAB 100 MILLIGRAM(S): 20 INJECTION, SOLUTION, CONCENTRATE INTRAVENOUS at 11:08

## 2021-12-30 RX ADMIN — CHLORHEXIDINE GLUCONATE 1 APPLICATION(S): 213 SOLUTION TOPICAL at 06:55

## 2021-12-30 RX ADMIN — CLOZAPINE 25 MILLIGRAM(S): 150 TABLET, ORALLY DISINTEGRATING ORAL at 22:12

## 2021-12-30 RX ADMIN — DEXMEDETOMIDINE HYDROCHLORIDE IN 0.9% SODIUM CHLORIDE 6 MICROGRAM(S)/KG/HR: 4 INJECTION INTRAVENOUS at 09:13

## 2021-12-30 RX ADMIN — OLANZAPINE 5 MILLIGRAM(S): 15 TABLET, FILM COATED ORAL at 11:57

## 2021-12-30 RX ADMIN — AMPICILLIN SODIUM AND SULBACTAM SODIUM 100 GRAM(S): 250; 125 INJECTION, POWDER, FOR SUSPENSION INTRAMUSCULAR; INTRAVENOUS at 00:11

## 2021-12-30 RX ADMIN — DEXMEDETOMIDINE HYDROCHLORIDE IN 0.9% SODIUM CHLORIDE 6 MICROGRAM(S)/KG/HR: 4 INJECTION INTRAVENOUS at 00:12

## 2021-12-30 RX ADMIN — Medication 102 MILLIGRAM(S): at 07:00

## 2021-12-30 RX ADMIN — OLANZAPINE 5 MILLIGRAM(S): 15 TABLET, FILM COATED ORAL at 17:03

## 2021-12-30 NOTE — PROGRESS NOTE ADULT - SUBJECTIVE AND OBJECTIVE BOX
Aggression/ Agitation   Patient Covid positive in ICU on BIPAP, still receiving Precedex for agitation, appears restless at bedside. Collateral obtained from Brett at [facility] ([phone]) reports that the patients last IM Haldol Decanoate injection was December 16th. Patient does not appear at his cognitive baseline, unable to obtain further mental status evaluation at this time.     Vital Signs Last 24 Hrs  T(C): 36.2 (30 Dec 2021 08:00), Max: 37.4 (29 Dec 2021 17:00)  T(F): 97.2 (30 Dec 2021 08:00), Max: 99.3 (29 Dec 2021 17:00)  HR: 56 (30 Dec 2021 08:42) (52 - 85)  BP: 124/71 (30 Dec 2021 08:00) (100/65 - 145/91)  BP(mean): 87 (30 Dec 2021 08:00) (76 - 117)  RR: 25 (30 Dec 2021 08:00) (13 - 37)  SpO2: 100% (30 Dec 2021 08:42) (88% - 100%)                          10.8   15.26 )-----------( 258      ( 30 Dec 2021 05:24 )             34.4   12-30    139  |  102  |  13.6  ----------------------------<  192<H>  4.7   |  29.0  |  0.37<L>    Ca    8.8      30 Dec 2021 05:24  Phos  4.3     12-30  Mg     2.3     12-30    TPro  6.6  /  Alb  3.4  /  TBili  0.3<L>  /  DBili  0.1  /  AST  27  /  ALT  34  /  AlkPhos  69  12-30    MEDICATIONS  (STANDING):  chlorhexidine 4% Liquid 1 Application(s) Topical <User Schedule>  cloZAPine 200 milliGRAM(s) Oral at bedtime  cloZAPine 25 milliGRAM(s) Oral at bedtime  dexAMETHasone  IVPB 10 milliGRAM(s) IV Intermittent daily  dexMEDEtomidine Infusion 0.2 MICROgram(s)/kG/Hr (6 mL/Hr) IV Continuous <Continuous>  enoxaparin Injectable 40 milliGRAM(s) SubCutaneous daily  guaiFENesin  milliGRAM(s) Oral every 12 hours  losartan 100 milliGRAM(s) Oral at bedtime  OLANZapine Injectable 5 milliGRAM(s) IntraMuscular every 6 hours  remdesivir  IVPB   IV Intermittent   remdesivir  IVPB 100 milliGRAM(s) IV Intermittent every 24 hours  tocilizumab (EUA) IVPB 800 milliGRAM(s) IV Intermittent once  valproic  acid Syrup 1000 milliGRAM(s) Oral at bedtime    MEDICATIONS  (PRN):  haloperidol    Injectable 5 milliGRAM(s) IV Push every 6 hours PRN restelssness and agitation  melatonin 3 milliGRAM(s) Oral at bedtime PRN Sleep  morphine  - Injectable 2 milliGRAM(s) IV Push every 4 hours PRN tachypnea/anxiousness

## 2021-12-30 NOTE — PROGRESS NOTE ADULT - SUBJECTIVE AND OBJECTIVE BOX
BRIEF HOSPITAL COURSE:   56 yr old male from [facility] on the [place] grounds with medical history of Schizo, DM, Obesity, HTN was sent in for right foot wound, which he states was noted yesterday. He does not recall trauma. However in ED, the wound appears > 1 day old and incidentally noted pulm infiltrates and nodule on imaging. Denies fevers, dyspnea, cough. States his wheezing/ congestion is regular and he takes inhalers.     Events last 24 hours / interval HPI:    Pt was aggressive yesterday, combative, non redirectable;  Needed code gray. s/p zyprexa 5mg, x2 10mg valium, now on 1:1 observation. PRN 5mg haldol for agitation if needed. No events since then.     Pt found at bedside this morning in MICU now Covid + on airborne precautions. Pt pleasant and mentating, slightly agitated. Moving around in the bed, uncomfrotable, non-purposeful, Restraints are off at this time. Pt is pulling at bipap mask. Chart checked and vital signs stable. No cardiopulmonary distress. Satting at %. Pt is on precedex ggt at 0.4 with no pressor support. Pt failed HFNC yesterday, may try again today. Pt non combative at this time. Pt normotensive at this time 124/71. Pt has a rider in making good urine. x2 peripherals, intact no other lines. Pt alert, unable to assess orientation or cognition, unable to speak due to bipap. No edema, bilateral lung sounds auscultated. No redness or unilateral swelling of LE.       Review of Systems:  CONSTITUTIONAL: No fever, chills, or fatigue  EYES: No eye pain, visual disturbances, or discharge  ENMT:  No difficulty hearing, tinnitus, vertigo; No sinus or throat pain  NECK: No pain or stiffness  RESPIRATORY: No cough, wheezing, chills or hemoptysis; No shortness of breath  CARDIOVASCULAR: No chest pain, palpitations, dizziness, or leg swelling  GASTROINTESTINAL: No abdominal or epigastric pain. No nausea, vomiting, or hematemesis; No diarrhea or constipation. No melena or hematochezia.  GENITOURINARY: No dysuria, frequency, hematuria, or incontinence  NEUROLOGICAL: No headaches, memory loss, loss of strength, numbness, or tremors  SKIN: No itching, burning, rashes, or lesions   MUSCULOSKELETAL: No joint pain or swelling; No muscle, back, or extremity pain  PSYCHIATRIC: No depression, anxiety, mood swings, or difficulty sleeping      Medications:  remdesivir  IVPB   IV Intermittent   remdesivir  IVPB 100 milliGRAM(s) IV Intermittent every 24 hours  losartan 100 milliGRAM(s) Oral at bedtime  guaiFENesin  milliGRAM(s) Oral every 12 hours  cloZAPine 20 milliGRAM(s) Oral at bedtime  cloZAPine 25 milliGRAM(s) Oral at bedtime  dexMEDEtomidine Infusion 0.2 MICROgram(s)/kG/Hr IV Continuous <Continuous>  haloperidol    Injectable 5 milliGRAM(s) IV Push every 6 hours PRN  melatonin 3 milliGRAM(s) Oral at bedtime PRN  morphine  - Injectable 2 milliGRAM(s) IV Push every 4 hours PRN  OLANZapine Injectable 5 milliGRAM(s) IntraMuscular every 6 hours  valproic  acid Syrup 1000 milliGRAM(s) Oral at bedtime  enoxaparin Injectable 40 milliGRAM(s) SubCutaneous daily  dexAMETHasone  IVPB 10 milliGRAM(s) IV Intermittent daily  tocilizumab (EUA) IVPB 800 milliGRAM(s) IV Intermittent once  chlorhexidine 4% Liquid 1 Application(s) Topical <User Schedule>    ICU Vital Signs Last 24 Hrs  T(C): 36.8 (30 Dec 2021 03:00), Max: 37.4 (29 Dec 2021 17:00)  T(F): 98.2 (30 Dec 2021 03:00), Max: 99.3 (29 Dec 2021 17:00)  HR: 65 (30 Dec 2021 07:00) (52 - 85)  BP: 125/73 (30 Dec 2021 07:00) (100/65 - 145/91)  BP(mean): 89 (30 Dec 2021 07:00) (76 - 117)  ABP: --  ABP(mean): --  RR: 14 (30 Dec 2021 07:00) (13 - 37)  SpO2: 99% (30 Dec 2021 07:00) (88% - 100%)      ABG - ( 29 Dec 2021 04:17 )  pH, Arterial: 7.330 pH, Blood: x     /  pCO2: 60    /  pO2: 89    / HCO3: 32    / Base Excess: 5.7   /  SaO2: 97.8        I&O's Detail    29 Dec 2021 07:01  -  30 Dec 2021 07:00  --------------------------------------------------------  IN:    Dexmedetomidine: 162 mL    IV PiggyBack: 200 mL    IV PiggyBack: 150 mL    IV PiggyBack: 100 mL  Total IN: 612 mL    OUT:    Norepinephrine: 0 mL    Voided (mL): 1405 mL  Total OUT: 1405 mL    Total NET: -793 mL      30 Dec 2021 07:01  -  30 Dec 2021 08:24  --------------------------------------------------------  IN:    Dexmedetomidine: 12 mL  Total IN: 12 mL    OUT:    Voided (mL): 35 mL  Total OUT: 35 mL    Total NET: -23 mL      LABS:                        10.8   15.26 )-----------( 258      ( 30 Dec 2021 05:24 )             34.4     12-30    139  |  102  |  13.6  ----------------------------<  192<H>  4.7   |  29.0  |  0.37<L>    Ca    8.8      30 Dec 2021 05:24  Phos  4.3     12-30  Mg     2.3     12-30    TPro  6.6  /  Alb  3.4  /  TBili  0.3<L>  /  DBili  0.1  /  AST  27  /  ALT  34  /  AlkPhos  69  12-3  PT/INR - ( 30 Dec 2021 05:24 )   PT: 14.1 sec;   INR: 1.23 ratio          CULTURES:  Rapid RVP Result: Detected (12-29 @ 09:31)  Culture Results:   <10,000 CFU/mL Normal Urogenital [place] (12-25 @ 06:38)  Culture Results:   No growth at 5 days. (12-24 @ 22:36)  Culture Results:   No growth at 5 days. (12-24 @ 22:36)  Rapid RVP Result: NotDetec (12-24 @ 22:35)      Physical Examination:  General: No acute distress. agitated   HEENT: Pupils equal, reactive to light.  Symmetric.  PULM: Clear to auscultation bilaterally, no significant sputum production  NECK: Supple, no lymphadenopathy, trachea midline  CVS: Regular rate and rhythm, no murmurs, rubs, or gallops  ABD: Soft, nondistended, nontender, normoactive bowel sounds, no masses  EXT: No edema, nontender, +r foot wound, improving   SKIN: Warm and well perfused, no rashes noted.  NEURO: Alert, interactive, nonfocal, limited exam d/t bipap mask   DEVICES:     RADIOLOGY: PROCEDURE DATE: 12/29/2021  INTERPRETATION: Portable chest radiograph  CLINICAL INFORMATION: Tachypnea  TECHNIQUE: Portable AP view of the chest.  COMPARISON: 12/24/2021 chest available for review.    FINDINGS:  RIGHT upper lobe LEFT basilar multifocal and diffuse ill-defined airspace opacities..  No pneumothorax.  The heart is mildly enlarged in transverse diameter. No hilar mass.  Visualized osseous structures are intact.  IMPRESSION: RIGHT upper lobe and LEFT basilar multifocal and diffuse ill-defined airspace opacities. The    Follow-up AP portable chest radiograph 12/29/2021 AT 4:55 AM:  Residual RIGHT upper lobe peripheral and LEFT perihilar multifocal and diffuse ill-defined airspace opacities.  Cardiomegaly    --- End of Report ---

## 2021-12-30 NOTE — PROVIDER CONTACT NOTE (EICU) - ACTION/TREATMENT ORDERED:
E-alerted by bedside RN, pt requesting to have Valproic acid 1000mg order changed from liquid to oral tablet, order placed as requested.

## 2021-12-30 NOTE — DIETITIAN INITIAL EVALUATION ADULT. - PERTINENT LABORATORY DATA
12-30 Na139 mmol/L Glu 192 mg/dL<H> K+ 4.7 mmol/L Cr  0.37 mg/dL<L> BUN 13.6 mg/dL Phos 4.3 mg/dL Alb 3.4 g/dL PAB n/a

## 2021-12-30 NOTE — PROGRESS NOTE ADULT - PROBLEM SELECTOR PLAN 2
Continue patient on his usual regimen of clozapine and Depakote when able to tolerate PO medications. Continue patient on his usual regimen of clozapine and Depakote when able to tolerate PO medications. Re-titration of clozapine would be needed if off it for > 72 hrs

## 2021-12-30 NOTE — PROGRESS NOTE ADULT - ASSESSMENT
56 yr old male from [facility] on the [place] grounds with medical history of Schizo, DM, Obesity, HTN was sent in for right foot wound, which he states was noted yesterday. He does not recall trauma. However in ED, the wound appears > 1 day old and incidentally noted pulm infiltrates and nodule on imaging. Denies fevers, dyspnea, cough.   takes inhalers.   AS ABOVE PT WITH RIGHT FOOT WOUND UNCLEAR ETIOLOGY   WILL FOLLOW UP BLOOD CX   FOOT APPEARS IMPROVED  UNCLEAR IF TRUE INFECTION   PT AGITATION ON PRECEDEX NOW ON BIPAP  PT ALSO WITH COVID POS PCR AND STARTED ON REMDESIVIR AND DECADRON  PT USING BIPAP  PT WOULD BE A CANDIDATE FOR ACTEMRA  PT UNABLE TO GIVE CONSENT  AS PER ETHICS NOTES WILL SIGN 2 PERSON CONSENT  BENEFITS OF ACTEMRA OUTWEIGH RISKS  WILL FOLLOWUP WITH KAVONER RECOMMENDATIONS

## 2021-12-30 NOTE — DIETITIAN INITIAL EVALUATION ADULT. - PERTINENT MEDS FT
MEDICATIONS  (STANDING):  chlorhexidine 4% Liquid 1 Application(s) Topical <User Schedule>  cloZAPine 200 milliGRAM(s) Oral at bedtime  cloZAPine 25 milliGRAM(s) Oral at bedtime  dexAMETHasone  IVPB 10 milliGRAM(s) IV Intermittent daily  dexMEDEtomidine Infusion 0.2 MICROgram(s)/kG/Hr (6 mL/Hr) IV Continuous <Continuous>  enoxaparin Injectable 40 milliGRAM(s) SubCutaneous daily  guaiFENesin  milliGRAM(s) Oral every 12 hours  losartan 100 milliGRAM(s) Oral at bedtime  OLANZapine Injectable 5 milliGRAM(s) IntraMuscular every 6 hours  remdesivir  IVPB   IV Intermittent   remdesivir  IVPB 100 milliGRAM(s) IV Intermittent every 24 hours  tocilizumab (EUA) IVPB 800 milliGRAM(s) IV Intermittent once  valproic  acid Syrup 1000 milliGRAM(s) Oral at bedtime    MEDICATIONS  (PRN):  haloperidol    Injectable 5 milliGRAM(s) IV Push every 6 hours PRN restelssness and agitation  melatonin 3 milliGRAM(s) Oral at bedtime PRN Sleep  morphine  - Injectable 2 milliGRAM(s) IV Push every 4 hours PRN tachypnea/anxiousness

## 2021-12-30 NOTE — PROGRESS NOTE ADULT - ASSESSMENT
Assessment:  1. Acute Hypoxic RF - COVID POSITIVE   2. R LE wound and cellulitis   3. Acute psychosis and delirium   4. Metabolic encephalopathy  5. Distributive vs. septic shock     Neuro:  -sedated on precedex 0.4 for compliance on AVAPS  -Pt on home meds, clozapine   -PRN morphine and haldol for restlessness combativeness and tachypnea     cloZAPine 200 milliGRAM(s) Oral at bedtime  cloZAPine 25 milliGRAM(s) Oral at bedtime  dexMEDEtomidine Infusion 0.2 MICROgram(s)/kG/Hr (6 mL/Hr) IV Continuous <Continuous>  haloperidol    Injectable 5 milliGRAM(s) IV Push every 6 hours PRN restelssness and agitation  melatonin 3 milliGRAM(s) Oral at bedtime PRN Sleep  morphine  - Injectable 2 milliGRAM(s) IV Push every 4 hours PRN tachypnea/anxiousness  valproic  acid Syrup 1000 milliGRAM(s) Oral at bedtime      CV:  - maintain MAP>65 to ensure end organ perfusion    - s/p 1L IVF bolus   - Pt hemodynamically stable, off levo ggt at this time.   - Avoid fluid overload    Pulm:  - Continue AVAPS, failed trial of HFNC, o2 requirements coming down, possible trial of HF   - Remdesivir started, continue Decadron for enhanced anti-inflammatory effect  - Candidate for toci, please discuss further w/ ID                      GI:  - NPO for now while on BIPAP   - Can get diet if can tolerate HFNC, trials today.     Renal:  - Even to net negative fluid balance as tolerated by hemodynamics and renal fx.    - Periodically evaluating volume status to assess need for IV diuresis, goal euvolemia  - Cr remains at 0.3-0.4, BUN within normal limits, Adequate UO, dark brown  - Continue monitoring UO and color.                 Heme:  - SCDs + Lovenox for DVT/PE ppx given enhanced prothrombotic nature of COVID infection      ID:  - WC elevated at 15, remains afebrile with no antipyretics administered    - Foot wound appears improved, doubt infx, d/c abx per ID recs  - COVID POS PCR, Continue REMDESIVIR, DECADRON, Acterma   - follow up Blood Cx   - PT UNABLE TO GIVE CONSENT, AS PER ETHICS NOTES WILL SIGN 2 PERSON CONSENT  BENEFITS OF ACTEMRA OUTWEIGH RISKS  - Follow up further recs from ID, appreciated     Endo:  - ISS for aggressive glycemic control to limit FS glucose to < 180mg/dl.    Goals of care considerations:  Ongoing assessment for patient specific treatment options based on progression or decline.  I have involved the family with updates and requests in guidance for medical decision making.      COVID 19 specific considerations and therapeutic  options based on the available and rapidly changing literature    40  Minutes of critical care time spent in the evaluation and management  management of this critically ill COVID-19 patient with life threatening organ dysfunction. Continuous assessments and interventions based on the interpretation of multiple databases. This is inclusive of time spent speaking at length  with family to update them of the current clinical condition of the patient answer all question and review the Goals of Care.        Disposition: Patient requires continued monitoring in MICU   Assessment:  1. Acute Hypoxic RF - COVID POSITIVE   2. R LE wound and cellulitis   3. Acute psychosis and delirium   4. Metabolic encephalopathy  5. Distributive vs. septic shock     Neuro:  -sedated on precedex 0.4 for compliance on AVAPS  -mentating, alert, non-focal.     CV:  - maintain MAP>65 to ensure end organ perfusion    - s/p 1L IVF bolus   - Pt hemodynamically stable, off levo ggt at this time.   - Avoid fluid overload    Pulm:  - Continue AVAPS, failed trial of HFNC, o2 requirements coming down, possible trial of HF   - Remdesivir started, continue Decadron for enhanced anti-inflammatory effect  - Candidate for toci, please discuss further w/ ID      GI:  - NPO for now while on BIPAP   - Can get diet if can tolerate HFNC, trials today.     Renal:  -Check repeat labs and monitor renal function, BUN CR wnl, stable   -Encourage PO intake as tolerated when off BIPAP   -monitor and replace electrolytes as needed   -Monitor input and output, even to net negative fluid balance as tolerated by hemodynamics  - Periodically evaluating volume status to assess need for IV diuresis, goal euvolemia                Heme:  - SCDs + Lovenox for DVT/PE ppx given enhanced prothrombotic nature of COVID infection    ID:  - WC elevated at 15, remains afebrile with no antipyretics administered    - Foot wound appears improved, doubt infx, d/c abx per ID recs  - COVID POS PCR, Continue REMDESIVIR, DECADRON, Acterma   - follow up Blood Cx   - PT UNABLE TO GIVE CONSENT, AS PER ETHICS NOTES WILL SIGN 2 PERSON CONSENT  BENEFITS OF ACTEMRA OUTWEIGH RISKS  - Follow up further recs from ID, appreciated     Endo:  - ISS for aggressive glycemic control to limit FS glucose to < 180mg/dl.    Psych:  - Aggression and agitation, Schizophrenia   - Begin patient on standing IV/IM Haldol 5mg Q8hr.  - Cont home  clozapine and Depakote when able to tolerate PO medications.  - Psych recs appreciated       Goals of care considerations: To be further discussed with MICU team, pt unable to consent. 2PP should be considered, ethics recs appreciated. May need to be consulted if escalated management is needed.     COVID 19 specific considerations and therapeutic  options based on the available and rapidly changing literature    Critical Care time: 40 mins assessing presenting problems of acute illness that poses high probability of life threatening deterioration or end organ damage/dysfunction.  Medical decision making including Initiating plan of care, reviewing data, reviewing radiology, direct patient bedside evaluation and interpretation of vital signs, any necessary ventilator management discussion with multidisciplinary team, discussing goals of care with patient/family, all non inclusive of procedures     Disposition: Patient requires continued monitoring in MICU   Assessment:  1. Acute Hypoxic RF - COVID POSITIVE   2. R LE wound and cellulitis   3. Acute psychosis and delirium   4. Metabolic encephalopathy  5. Distributive vs. septic shock     Neuro:  -sedated on precedex 0.4 for compliance on AVAPS  -mentating, alert, non-focal.     CV:  - maintain MAP>65 to ensure end organ perfusion    - s/p 1L IVF bolus   - Pt hemodynamically stable, off levo ggt at this time.   - Avoid fluid overload    Pulm:  - Continue AVAPS, failed trial of HFNC, o2 requirements coming down, possible trial of HF   - Remdesivir started, continue Decadron for enhanced anti-inflammatory effect  - Candidate for toci, please discuss further w/ ID    -Residual RIGHT upper lobe peripheral and LEFT perihilar multifocal and diffuse ill-defined airspace opacities.   -Follow up CXR if clinical state does not improve, of ABX at this time.     GI:  - NPO for now while on BIPAP   - Can get diet if can tolerate HFNC, trials today.     Renal:  -Check repeat labs and monitor renal function, BUN CR wnl, stable   -Encourage PO intake as tolerated when off BIPAP   -monitor and replace electrolytes as needed   -Monitor input and output, even to net negative fluid balance as tolerated by hemodynamics  - Periodically evaluating volume status to assess need for IV diuresis, goal euvolemia                Heme:  - SCDs + Lovenox for DVT/PE ppx given enhanced prothrombotic nature of COVID infection    ID:  - WC elevated at 15, remains afebrile with no antipyretics administered    - Foot wound appears improved, doubt infx, d/c abx per ID recs  - COVID POS PCR, Continue REMDESIVIR, DECADRON, Acterma   - follow up Blood Cx   - PT UNABLE TO GIVE CONSENT, AS PER ETHICS NOTES WILL SIGN 2 PERSON CONSENT  BENEFITS OF ACTEMRA OUTWEIGH RISKS  - Follow up further recs from ID, appreciated     Endo:  - ISS for aggressive glycemic control to limit FS glucose to < 180mg/dl.    Psych:  - Aggression and agitation, Schizophrenia {[id number]}  - Begin patient on standing IV/IM Haldol 5mg Q8hr.{[id number]}  - Cont home  clozapine and Depakote when able to tolerate PO medications.  - Psych recs appreciated       Goals of care considerations: To be further discussed with MICU team, pt unable to consent. 2PP should be considered, ethics recs appreciated. May need to be consulted if escalated management is needed.     COVID 19 specific considerations and therapeutic  options based on the available and rapidly changing literature    Critical Care time: 40 mins assessing presenting problems of acute illness that poses high probability of life threatening deterioration or end organ damage/dysfunction.  Medical decision making including Initiating plan of care, reviewing data, reviewing radiology, direct patient bedside evaluation and interpretation of vital signs, any necessary ventilator management discussion with multidisciplinary team, discussing goals of care with patient/family, all non inclusive of procedures     Disposition: Patient requires continued monitoring in MICU     Assessment:  1. Acute Hypoxic RF - COVID POSITIVE   2. R LE wound and cellulitis   3. Acute psychosis and delirium   4. Metabolic encephalopathy  5. Distributive vs. septic shock     Neuro:  -sedated on precedex 0.4 for compliance on AVAPS  -mentating, alert, non-focal.     CV:  - maintain MAP>65 to ensure end organ perfusion    - s/p 1L IVF bolus   - Pt hemodynamically stable, off levo ggt at this time.   - Avoid fluid overload    Pulm:  - Continue AVAPS, failed trial of HFNC, o2 requirements coming down, possible trial of HF   - Remdesivir started, continue Decadron for enhanced anti-inflammatory effect  - Candidate for toci, please discuss further w/ ID    -Residual RIGHT upper lobe peripheral and LEFT perihilar multifocal and diffuse ill-defined airspace opacities.   -Follow up CXR if clinical state does not improve, of ABX at this time.     GI:  - NPO for now while on BIPAP   - Can get diet if can tolerate HFNC, trials today.     Renal:  -Check repeat labs and monitor renal function, BUN CR wnl, stable   -Encourage PO intake as tolerated when off BIPAP   -monitor and replace electrolytes as needed   -Monitor input and output, even to net negative fluid balance as tolerated by hemodynamics  - Periodically evaluating volume status to assess need for IV diuresis, goal euvolemia                Heme:  - SCDs + Lovenox for DVT/PE ppx given enhanced prothrombotic nature of COVID infection    ID:  - WC elevated at 15, remains afebrile with no antipyretics administered    - Foot wound appears improved, doubt infx, d/c abx per ID recs  - COVID POS PCR, Continue REMDESIVIR, DECADRON, Acterma   - follow up Blood Cx   - PT UNABLE TO GIVE CONSENT, AS PER ETHICS NOTES WILL SIGN 2 PERSON CONSENT  BENEFITS OF ACTEMRA OUTWEIGH RISKS  - Follow up further recs from ID, appreciated     Endo:  - ISS for aggressive glycemic control to limit FS glucose to < 180mg/dl.    Psych:  - Aggression and agitation, Schizophrenia {[id number]}  - Begin patient on standing IV/IM Haldol 5mg Q8hr.{[id number]}  - Cont home  clozapine and Depakote when able to tolerate PO medications.  - Psych recs appreciated       Goals of care considerations: To be further discussed with MICU team, pt unable to consent. 2PC should be considered, ethics recs appreciated. May need to be consulted if escalated management is needed.     COVID 19 specific considerations and therapeutic  options based on the available and rapidly changing literature    Critical Care time: 40 mins assessing presenting problems of acute illness that poses high probability of life threatening deterioration or end organ damage/dysfunction.  Medical decision making including Initiating plan of care, reviewing data, reviewing radiology, direct patient bedside evaluation and interpretation of vital signs, any necessary ventilator management discussion with multidisciplinary team, discussing goals of care with patient/family, all non inclusive of procedures     Disposition: Patient requires continued monitoring in MICU

## 2021-12-30 NOTE — PROGRESS NOTE ADULT - PROBLEM SELECTOR PLAN 1
Continue with restraints and 1:1 for staff and patient safety
Begin patient on standing IV/IM Haldol 5mg Q8hr

## 2021-12-30 NOTE — DIETITIAN INITIAL EVALUATION ADULT. - OTHER INFO
56 year old male PMH of schizophrenia, DM, obesity, HTN, admitted with right LE wound and cellulitiis. Also found with acute respiratory failure, COVID+, acute psychosis and delirium, metabolic encephalopathy, and distributive vs. septic shock. Pt on BiPAP at this time, unable to obtain nutrition hx. Noted to be agitated as well, receiving Precedex. RD to follow up with subjective interview as feasible.

## 2021-12-30 NOTE — PROGRESS NOTE ADULT - SUBJECTIVE AND OBJECTIVE BOX
INFECTIOUS DISEASES AND INTERNAL MEDICINE at [place]  =======================================================  Marcelo Beltre MD  Diplomates American Board of Internal Medicine and Infectious Diseases  Telephone [phone]  Fax            [phone]  =======================================================    TIFFANY HERCULES [id number]    Follow up:    Allergies:  No Known Allergies      Medications:  ampicillin/sulbactam  IVPB 1.5 Gram(s) IV Intermittent every 6 hours  ampicillin/sulbactam  IVPB      chlorhexidine 4% Liquid 1 Application(s) Topical <User Schedule>  cloZAPine 200 milliGRAM(s) Oral at bedtime  cloZAPine 25 milliGRAM(s) Oral at bedtime  dexAMETHasone  IVPB 10 milliGRAM(s) IV Intermittent daily  dexMEDEtomidine Infusion 0.2 MICROgram(s)/kG/Hr IV Continuous <Continuous>  doxycycline IVPB 100 milliGRAM(s) IV Intermittent every 12 hours  enoxaparin Injectable 40 milliGRAM(s) SubCutaneous daily  guaiFENesin  milliGRAM(s) Oral every 12 hours  haloperidol    Injectable 5 milliGRAM(s) IV Push every 6 hours PRN  losartan 100 milliGRAM(s) Oral at bedtime  melatonin 3 milliGRAM(s) Oral at bedtime PRN  morphine  - Injectable 2 milliGRAM(s) IV Push every 4 hours PRN  OLANZapine Injectable 5 milliGRAM(s) IntraMuscular every 6 hours  remdesivir  IVPB   IV Intermittent   remdesivir  IVPB 100 milliGRAM(s) IV Intermittent every 24 hours  valproic  acid Syrup 1000 milliGRAM(s) Oral at bedtime    SOCIAL       FAMILY   FAMILY HISTORY:    REVIEW OF SYSTEMS:  CONSTITUTIONAL:  No Fever or chills  HEENT:   No diplopia or blurred vision.  No earache, sore throat or runny nose.  CARDIOVASCULAR:  No pressure, squeezing, strangling, tightness, heaviness or aching about the chest, neck, axilla or epigastrium.  RESPIRATORY:  No cough, shortness of breath, PND or orthopnea.  GASTROINTESTINAL:  No nausea, vomiting or diarrhea.  GENITOURINARY:  No dysuria, frequency or urgency. No Blood in urine  MUSCULOSKELETAL:   moves all joints  SKIN:  No change in skin, hair or nails.  NEUROLOGIC:  No paresthesias, fasciculations, seizures or weakness.  PSYCHIATRIC:  No disorder of thought or mood.  ENDOCRINE:  No heat or cold intolerance, polyuria or polydipsia.  HEMATOLOGICAL:  No easy bruising or bleeding.            Physical Exam:  ICU Vital Signs Last 24 Hrs  T(C): 36.8 (30 Dec 2021 03:00), Max: 37.4 (29 Dec 2021 17:00)  T(F): 98.2 (30 Dec 2021 03:00), Max: 99.3 (29 Dec 2021 17:00)  HR: 65 (30 Dec 2021 07:00) (52 - 85)  BP: 125/73 (30 Dec 2021 07:00) (100/65 - 145/91)  BP(mean): 89 (30 Dec 2021 07:00) (76 - 117)  ABP: --  ABP(mean): --  RR: 14 (30 Dec 2021 07:00) (13 - 37)  SpO2: 99% (30 Dec 2021 07:00) (88% - 100%)    GEN: NAD,   HEENT: normocephalic and atraumatic. EOMI. DARON.    NECK: Supple. No carotid bruits.  No lymphadenopathy or thyromegaly.  LUNGS: Clear to auscultation.  HEART: Regular rate and rhythm without murmur.  ABDOMEN: Soft, nontender, and nondistended.  Positive bowel sounds.    : No CVA tenderness  EXTREMITIES: Without any cyanosis, clubbing, rash, lesions or edema.  MSK: no joint swelling  NEUROLOGIC: Cranial nerves II through XII are grossly intact.  PSYCHIATRIC: Appropriate affect .  SKIN: No ulceration or induration present.        Labs:  Vitals:  ============  T(F): 98.2 (30 Dec 2021 03:00), Max: 99.3 (29 Dec 2021 17:00)  HR: 65 (30 Dec 2021 07:00)  BP: 125/73 (30 Dec 2021 07:00)  RR: 14 (30 Dec 2021 07:00)  SpO2: 99% (30 Dec 2021 07:00) (88% - 100%)  temp max in last 48H T(F): , Max: 99.3 (12-29-21 @ 17:00)    =======================================================  Current Antibiotics:  ampicillin/sulbactam  IVPB 1.5 Gram(s) IV Intermittent every 6 hours  ampicillin/sulbactam  IVPB      doxycycline IVPB 100 milliGRAM(s) IV Intermittent every 12 hours  remdesivir  IVPB   IV Intermittent   remdesivir  IVPB 100 milliGRAM(s) IV Intermittent every 24 hours    Other medications:  chlorhexidine 4% Liquid 1 Application(s) Topical <User Schedule>  cloZAPine 200 milliGRAM(s) Oral at bedtime  cloZAPine 25 milliGRAM(s) Oral at bedtime  dexAMETHasone  IVPB 10 milliGRAM(s) IV Intermittent daily  dexMEDEtomidine Infusion 0.2 MICROgram(s)/kG/Hr IV Continuous <Continuous>  enoxaparin Injectable 40 milliGRAM(s) SubCutaneous daily  guaiFENesin  milliGRAM(s) Oral every 12 hours  losartan 100 milliGRAM(s) Oral at bedtime  OLANZapine Injectable 5 milliGRAM(s) IntraMuscular every 6 hours  valproic  acid Syrup 1000 milliGRAM(s) Oral at bedtime      =======================================================  Labs:                        10.8   15.26 )-----------( 258      ( 30 Dec 2021 05:24 )             34.4     12-30    139  |  102  |  13.6  ----------------------------<  192<H>  4.7   |  29.0  |  0.37<L>    Ca    8.8      30 Dec 2021 05:24  Phos  4.3     12-30  Mg     2.3     12-30    TPro  6.6  /  Alb  3.4  /  TBili  0.3<L>  /  DBili  0.1  /  AST  27  /  ALT  34  /  AlkPhos  69  12-30      Culture - Urine (collected 12-25-21 @ 06:38)  Source: Clean Catch Clean Catch (Midstream)  Final Report (12-26-21 @ 12:44):    <10,000 CFU/mL Normal Urogenital [place]    Culture - Blood (collected 12-24-21 @ 22:36)  Source: .Blood Blood-Peripheral  Final Report (12-29-21 @ 23:00):    No growth at 5 days.    Culture - Blood (collected 12-24-21 @ 22:36)  Source: .Blood Blood-Peripheral  Final Report (12-29-21 @ 23:00):    No growth at 5 days.      Creatinine, Serum: 0.37 mg/dL (12-30-21 @ 05:24)  Creatinine, Serum: 0.39 mg/dL (12-29-21 @ 04:51)  Creatinine, Serum: 0.39 mg/dL (12-28-21 @ 03:29)  Creatinine, Serum: 0.34 mg/dL (12-27-21 @ 08:13)  Creatinine, Serum: 0.50 mg/dL (12-26-21 @ 19:34)            WBC Count: 15.26 K/uL (12-30-21 @ 05:24)  WBC Count: 11.67 K/uL (12-29-21 @ 04:51)  WBC Count: 10.66 K/uL (12-28-21 @ 03:29)  WBC Count: 10.66 K/uL (12-27-21 @ 08:13)  WBC Count: 11.79 K/uL (12-26-21 @ 19:34)    SARS-CoV-2: Detected (12-29-21 @ 09:31)  Rapid RVP Result: Detected (12-29-21 @ 09:31)  SARS-CoV-2: NotDetec (12-24-21 @ 22:35)  Rapid RVP Result: NotDetec (12-24-21 @ 22:35)        Alkaline Phosphatase, Serum: 69 U/L (12-30-21 @ 05:24)  Alkaline Phosphatase, Serum: 64 U/L (12-30-21 @ 05:24)  Alkaline Phosphatase, Serum: 65 U/L (12-29-21 @ 04:51)  Alkaline Phosphatase, Serum: 74 U/L (12-28-21 @ 03:29)  Alkaline Phosphatase, Serum: 80 U/L (12-27-21 @ 08:13)  Alkaline Phosphatase, Serum: 84 U/L (12-26-21 @ 19:34)  Alanine Aminotransferase (ALT/SGPT): 34 U/L (12-30-21 @ 05:24)  Alanine Aminotransferase (ALT/SGPT): 35 U/L (12-30-21 @ 05:24)  Alanine Aminotransferase (ALT/SGPT): 44 U/L (12-29-21 @ 04:51)  Alanine Aminotransferase (ALT/SGPT): 55 U/L (12-28-21 @ 03:29)  Alanine Aminotransferase (ALT/SGPT): 69 U/L (12-27-21 @ 08:13)  Alanine Aminotransferase (ALT/SGPT): 75 U/L (12-26-21 @ 19:34)  Aspartate Aminotransferase (AST/SGOT): 27 U/L (12-30-21 @ 05:24)  Aspartate Aminotransferase (AST/SGOT): 24 U/L (12-30-21 @ 05:24)  Aspartate Aminotransferase (AST/SGOT): 31 U/L (12-29-21 @ 04:51)  Aspartate Aminotransferase (AST/SGOT): 43 U/L (12-28-21 @ 03:29)  Aspartate Aminotransferase (AST/SGOT): 78 U/L (12-27-21 @ 08:13)  Aspartate Aminotransferase (AST/SGOT): 106 U/L (12-26-21 @ 19:34)  Bilirubin Total, Serum: 0.3 mg/dL (12-30-21 @ 05:24)  Bilirubin Total, Serum: 0.3 mg/dL (12-30-21 @ 05:24)  Bilirubin Total, Serum: 0.4 mg/dL (12-29-21 @ 04:51)  Bilirubin Total, Serum: 0.5 mg/dL (12-28-21 @ 03:29)  Bilirubin Total, Serum: 0.4 mg/dL (12-27-21 @ 08:13)  Bilirubin Total, Serum: 0.5 mg/dL (12-26-21 @ 19:34)  Bilirubin Direct, Serum: 0.1 mg/dL (12-30-21 @ 05:24)   INFECTIOUS DISEASES AND INTERNAL MEDICINE at [place]  =======================================================  Marcelo Beltre MD  Diplomates American Board of Internal Medicine and Infectious Diseases  Telephone [phone]  Fax            [phone]  =======================================================    GARRY HERCULESORE [id number]    Follow up:    Allergies:  No Known Allergies      Medications:  ampicillin/sulbactam  IVPB 1.5 Gram(s) IV Intermittent every 6 hours  ampicillin/sulbactam  IVPB      chlorhexidine 4% Liquid 1 Application(s) Topical <User Schedule>  cloZAPine 200 milliGRAM(s) Oral at bedtime  cloZAPine 25 milliGRAM(s) Oral at bedtime  dexAMETHasone  IVPB 10 milliGRAM(s) IV Intermittent daily  dexMEDEtomidine Infusion 0.2 MICROgram(s)/kG/Hr IV Continuous <Continuous>  doxycycline IVPB 100 milliGRAM(s) IV Intermittent every 12 hours  enoxaparin Injectable 40 milliGRAM(s) SubCutaneous daily  guaiFENesin  milliGRAM(s) Oral every 12 hours  haloperidol    Injectable 5 milliGRAM(s) IV Push every 6 hours PRN  losartan 100 milliGRAM(s) Oral at bedtime  melatonin 3 milliGRAM(s) Oral at bedtime PRN  morphine  - Injectable 2 milliGRAM(s) IV Push every 4 hours PRN  OLANZapine Injectable 5 milliGRAM(s) IntraMuscular every 6 hours  remdesivir  IVPB   IV Intermittent   remdesivir  IVPB 100 milliGRAM(s) IV Intermittent every 24 hours  valproic  acid Syrup 1000 milliGRAM(s) Oral at bedtime    SOCIAL       FAMILY   FAMILY HISTORY:    REVIEW OF SYSTEMS:   unable to obtain         Physical Exam:  ICU Vital Signs Last 24 Hrs  T(C): 36.8 (30 Dec 2021 03:00), Max: 37.4 (29 Dec 2021 17:00)  T(F): 98.2 (30 Dec 2021 03:00), Max: 99.3 (29 Dec 2021 17:00)  HR: 65 (30 Dec 2021 07:00) (52 - 85)  BP: 125/73 (30 Dec 2021 07:00) (100/65 - 145/91)  BP(mean): 89 (30 Dec 2021 07:00) (76 - 117)  ABP: --  ABP(mean): --  RR: 14 (30 Dec 2021 07:00) (13 - 37)  SpO2: 99% (30 Dec 2021 07:00) (88% - 100%)    GEN: NAD,   HEENT: normocephalic and atraumatic. EOMI. DARON.    NECK: Supple. No carotid bruits.  No lymphadenopathy or thyromegaly.  LUNGS: Clear to auscultation.  HEART: Regular rate and rhythm without murmur.  ABDOMEN: Soft, nontender, and nondistended.  Positive bowel sounds.    : No CVA tenderness  EXTREMITIES: Without any cyanosis, clubbing, rash, lesions or edema.  MSK: no joint swelling  NEUROLOGIC: Cranial nerves II through XII are grossly intact.  PSYCHIATRIC: Appropriate affect .  SKIN: No ulceration or induration present.        Labs:  Vitals:  ============  T(F): 98.2 (30 Dec 2021 03:00), Max: 99.3 (29 Dec 2021 17:00)  HR: 65 (30 Dec 2021 07:00)  BP: 125/73 (30 Dec 2021 07:00)  RR: 14 (30 Dec 2021 07:00)  SpO2: 99% (30 Dec 2021 07:00) (88% - 100%)  temp max in last 48H T(F): , Max: 99.3 (12-29-21 @ 17:00)    =======================================================  Current Antibiotics:  ampicillin/sulbactam  IVPB 1.5 Gram(s) IV Intermittent every 6 hours  ampicillin/sulbactam  IVPB      doxycycline IVPB 100 milliGRAM(s) IV Intermittent every 12 hours  remdesivir  IVPB   IV Intermittent   remdesivir  IVPB 100 milliGRAM(s) IV Intermittent every 24 hours    Other medications:  chlorhexidine 4% Liquid 1 Application(s) Topical <User Schedule>  cloZAPine 200 milliGRAM(s) Oral at bedtime  cloZAPine 25 milliGRAM(s) Oral at bedtime  dexAMETHasone  IVPB 10 milliGRAM(s) IV Intermittent daily  dexMEDEtomidine Infusion 0.2 MICROgram(s)/kG/Hr IV Continuous <Continuous>  enoxaparin Injectable 40 milliGRAM(s) SubCutaneous daily  guaiFENesin  milliGRAM(s) Oral every 12 hours  losartan 100 milliGRAM(s) Oral at bedtime  OLANZapine Injectable 5 milliGRAM(s) IntraMuscular every 6 hours  valproic  acid Syrup 1000 milliGRAM(s) Oral at bedtime      =======================================================  Labs:                        10.8   15.26 )-----------( 258      ( 30 Dec 2021 05:24 )             34.4     12-30    139  |  102  |  13.6  ----------------------------<  192<H>  4.7   |  29.0  |  0.37<L>    Ca    8.8      30 Dec 2021 05:24  Phos  4.3     12-30  Mg     2.3     12-30    TPro  6.6  /  Alb  3.4  /  TBili  0.3<L>  /  DBili  0.1  /  AST  27  /  ALT  34  /  AlkPhos  69  12-30      Culture - Urine (collected 12-25-21 @ 06:38)  Source: Clean Catch Clean Catch (Midstream)  Final Report (12-26-21 @ 12:44):    <10,000 CFU/mL Normal Urogenital [place]    Culture - Blood (collected 12-24-21 @ 22:36)  Source: .Blood Blood-Peripheral  Final Report (12-29-21 @ 23:00):    No growth at 5 days.    Culture - Blood (collected 12-24-21 @ 22:36)  Source: .Blood Blood-Peripheral  Final Report (12-29-21 @ 23:00):    No growth at 5 days.      Creatinine, Serum: 0.37 mg/dL (12-30-21 @ 05:24)  Creatinine, Serum: 0.39 mg/dL (12-29-21 @ 04:51)  Creatinine, Serum: 0.39 mg/dL (12-28-21 @ 03:29)  Creatinine, Serum: 0.34 mg/dL (12-27-21 @ 08:13)  Creatinine, Serum: 0.50 mg/dL (12-26-21 @ 19:34)            WBC Count: 15.26 K/uL (12-30-21 @ 05:24)  WBC Count: 11.67 K/uL (12-29-21 @ 04:51)  WBC Count: 10.66 K/uL (12-28-21 @ 03:29)  WBC Count: 10.66 K/uL (12-27-21 @ 08:13)  WBC Count: 11.79 K/uL (12-26-21 @ 19:34)    SARS-CoV-2: Detected (12-29-21 @ 09:31)  Rapid RVP Result: Detected (12-29-21 @ 09:31)  SARS-CoV-2: NotDetec (12-24-21 @ 22:35)  Rapid RVP Result: NotDetec (12-24-21 @ 22:35)        Alkaline Phosphatase, Serum: 69 U/L (12-30-21 @ 05:24)  Alkaline Phosphatase, Serum: 64 U/L (12-30-21 @ 05:24)  Alkaline Phosphatase, Serum: 65 U/L (12-29-21 @ 04:51)  Alkaline Phosphatase, Serum: 74 U/L (12-28-21 @ 03:29)  Alkaline Phosphatase, Serum: 80 U/L (12-27-21 @ 08:13)  Alkaline Phosphatase, Serum: 84 U/L (12-26-21 @ 19:34)  Alanine Aminotransferase (ALT/SGPT): 34 U/L (12-30-21 @ 05:24)  Alanine Aminotransferase (ALT/SGPT): 35 U/L (12-30-21 @ 05:24)  Alanine Aminotransferase (ALT/SGPT): 44 U/L (12-29-21 @ 04:51)  Alanine Aminotransferase (ALT/SGPT): 55 U/L (12-28-21 @ 03:29)  Alanine Aminotransferase (ALT/SGPT): 69 U/L (12-27-21 @ 08:13)  Alanine Aminotransferase (ALT/SGPT): 75 U/L (12-26-21 @ 19:34)  Aspartate Aminotransferase (AST/SGOT): 27 U/L (12-30-21 @ 05:24)  Aspartate Aminotransferase (AST/SGOT): 24 U/L (12-30-21 @ 05:24)  Aspartate Aminotransferase (AST/SGOT): 31 U/L (12-29-21 @ 04:51)  Aspartate Aminotransferase (AST/SGOT): 43 U/L (12-28-21 @ 03:29)  Aspartate Aminotransferase (AST/SGOT): 78 U/L (12-27-21 @ 08:13)  Aspartate Aminotransferase (AST/SGOT): 106 U/L (12-26-21 @ 19:34)  Bilirubin Total, Serum: 0.3 mg/dL (12-30-21 @ 05:24)  Bilirubin Total, Serum: 0.3 mg/dL (12-30-21 @ 05:24)  Bilirubin Total, Serum: 0.4 mg/dL (12-29-21 @ 04:51)  Bilirubin Total, Serum: 0.5 mg/dL (12-28-21 @ 03:29)  Bilirubin Total, Serum: 0.4 mg/dL (12-27-21 @ 08:13)  Bilirubin Total, Serum: 0.5 mg/dL (12-26-21 @ 19:34)  Bilirubin Direct, Serum: 0.1 mg/dL (12-30-21 @ 05:24)

## 2021-12-31 LAB
ALBUMIN SERPL ELPH-MCNC: 3.5 G/DL — SIGNIFICANT CHANGE UP (ref 3.3–5.2)
ALP SERPL-CCNC: 63 U/L — SIGNIFICANT CHANGE UP (ref 40–120)
ALT FLD-CCNC: 32 U/L — SIGNIFICANT CHANGE UP
ANION GAP SERPL CALC-SCNC: 10 MMOL/L — SIGNIFICANT CHANGE UP (ref 5–17)
AST SERPL-CCNC: 26 U/L — SIGNIFICANT CHANGE UP
BASOPHILS # BLD AUTO: 0.02 K/UL — SIGNIFICANT CHANGE UP (ref 0–0.2)
BASOPHILS NFR BLD AUTO: 0.2 % — SIGNIFICANT CHANGE UP (ref 0–2)
BILIRUB DIRECT SERPL-MCNC: 0.1 MG/DL — SIGNIFICANT CHANGE UP (ref 0–0.3)
BILIRUB INDIRECT FLD-MCNC: 0.2 MG/DL — SIGNIFICANT CHANGE UP (ref 0.2–1)
BILIRUB SERPL-MCNC: 0.3 MG/DL — LOW (ref 0.4–2)
BUN SERPL-MCNC: 19.2 MG/DL — SIGNIFICANT CHANGE UP (ref 8–20)
CALCIUM SERPL-MCNC: 8.9 MG/DL — SIGNIFICANT CHANGE UP (ref 8.6–10.2)
CHLORIDE SERPL-SCNC: 99 MMOL/L — SIGNIFICANT CHANGE UP (ref 98–107)
CO2 SERPL-SCNC: 29 MMOL/L — SIGNIFICANT CHANGE UP (ref 22–29)
CREAT SERPL-MCNC: 0.36 MG/DL — LOW (ref 0.5–1.3)
EOSINOPHIL # BLD AUTO: 0.02 K/UL — SIGNIFICANT CHANGE UP (ref 0–0.5)
EOSINOPHIL NFR BLD AUTO: 0.2 % — SIGNIFICANT CHANGE UP (ref 0–6)
GLUCOSE SERPL-MCNC: 189 MG/DL — HIGH (ref 70–99)
HCT VFR BLD CALC: 35 % — LOW (ref 39–50)
HGB BLD-MCNC: 11.3 G/DL — LOW (ref 13–17)
IMM GRANULOCYTES NFR BLD AUTO: 0.4 % — SIGNIFICANT CHANGE UP (ref 0–1.5)
INR BLD: 1.32 RATIO — HIGH (ref 0.88–1.16)
LYMPHOCYTES # BLD AUTO: 1.66 K/UL — SIGNIFICANT CHANGE UP (ref 1–3.3)
LYMPHOCYTES # BLD AUTO: 13.8 % — SIGNIFICANT CHANGE UP (ref 13–44)
MAGNESIUM SERPL-MCNC: 2.1 MG/DL — SIGNIFICANT CHANGE UP (ref 1.6–2.6)
MCHC RBC-ENTMCNC: 27.8 PG — SIGNIFICANT CHANGE UP (ref 27–34)
MCHC RBC-ENTMCNC: 32.3 GM/DL — SIGNIFICANT CHANGE UP (ref 32–36)
MCV RBC AUTO: 86.2 FL — SIGNIFICANT CHANGE UP (ref 80–100)
MONOCYTES # BLD AUTO: 0.97 K/UL — HIGH (ref 0–0.9)
MONOCYTES NFR BLD AUTO: 8 % — SIGNIFICANT CHANGE UP (ref 2–14)
NEUTROPHILS # BLD AUTO: 9.35 K/UL — HIGH (ref 1.8–7.4)
NEUTROPHILS NFR BLD AUTO: 77.4 % — HIGH (ref 43–77)
PHOSPHATE SERPL-MCNC: 3.1 MG/DL — SIGNIFICANT CHANGE UP (ref 2.4–4.7)
PLATELET # BLD AUTO: 273 K/UL — SIGNIFICANT CHANGE UP (ref 150–400)
POTASSIUM SERPL-MCNC: 4 MMOL/L — SIGNIFICANT CHANGE UP (ref 3.5–5.3)
POTASSIUM SERPL-SCNC: 4 MMOL/L — SIGNIFICANT CHANGE UP (ref 3.5–5.3)
PROT SERPL-MCNC: 6.4 G/DL — LOW (ref 6.6–8.7)
PROTHROM AB SERPL-ACNC: 15.1 SEC — HIGH (ref 10.6–13.6)
RBC # BLD: 4.06 M/UL — LOW (ref 4.2–5.8)
RBC # FLD: 13.9 % — SIGNIFICANT CHANGE UP (ref 10.3–14.5)
SODIUM SERPL-SCNC: 138 MMOL/L — SIGNIFICANT CHANGE UP (ref 135–145)
WBC # BLD: 12.07 K/UL — HIGH (ref 3.8–10.5)
WBC # FLD AUTO: 12.07 K/UL — HIGH (ref 3.8–10.5)

## 2021-12-31 PROCEDURE — 99232 SBSQ HOSP IP/OBS MODERATE 35: CPT

## 2021-12-31 PROCEDURE — 99233 SBSQ HOSP IP/OBS HIGH 50: CPT

## 2021-12-31 PROCEDURE — 12345: CPT | Mod: NC

## 2021-12-31 RX ORDER — ALBUTEROL 90 UG/1
1 AEROSOL, METERED ORAL EVERY 4 HOURS
Refills: 0 | Status: DISCONTINUED | OUTPATIENT
Start: 2021-12-31 | End: 2022-01-06

## 2021-12-31 RX ADMIN — CLOZAPINE 25 MILLIGRAM(S): 150 TABLET, ORALLY DISINTEGRATING ORAL at 22:29

## 2021-12-31 RX ADMIN — CHLORHEXIDINE GLUCONATE 1 APPLICATION(S): 213 SOLUTION TOPICAL at 05:45

## 2021-12-31 RX ADMIN — Medication 102 MILLIGRAM(S): at 05:28

## 2021-12-31 RX ADMIN — OLANZAPINE 5 MILLIGRAM(S): 15 TABLET, FILM COATED ORAL at 05:28

## 2021-12-31 RX ADMIN — ENOXAPARIN SODIUM 40 MILLIGRAM(S): 100 INJECTION SUBCUTANEOUS at 11:19

## 2021-12-31 RX ADMIN — LOSARTAN POTASSIUM 100 MILLIGRAM(S): 100 TABLET, FILM COATED ORAL at 22:28

## 2021-12-31 RX ADMIN — OLANZAPINE 5 MILLIGRAM(S): 15 TABLET, FILM COATED ORAL at 11:19

## 2021-12-31 RX ADMIN — OLANZAPINE 5 MILLIGRAM(S): 15 TABLET, FILM COATED ORAL at 00:07

## 2021-12-31 RX ADMIN — HALOPERIDOL DECANOATE 5 MILLIGRAM(S): 100 INJECTION INTRAMUSCULAR at 16:20

## 2021-12-31 RX ADMIN — CLOZAPINE 200 MILLIGRAM(S): 150 TABLET, ORALLY DISINTEGRATING ORAL at 22:27

## 2021-12-31 RX ADMIN — Medication 600 MILLIGRAM(S): at 05:28

## 2021-12-31 RX ADMIN — OLANZAPINE 5 MILLIGRAM(S): 15 TABLET, FILM COATED ORAL at 17:15

## 2021-12-31 RX ADMIN — REMDESIVIR 500 MILLIGRAM(S): 5 INJECTION INTRAVENOUS at 23:29

## 2021-12-31 RX ADMIN — Medication 600 MILLIGRAM(S): at 17:15

## 2021-12-31 RX ADMIN — Medication 1000 MILLIGRAM(S): at 22:27

## 2021-12-31 NOTE — CONSULT NOTE ADULT - CONSULT REASON
Combative and hypoxia
RIGHT  FOOT
To assist in the ethical dilemma posed by a 56-year-old male with schizophrenia who presented with a foot wound, now with COVID 19 pneumonia and a concerning lung nodule, regarding goals of care and unclear surrogacy.

## 2021-12-31 NOTE — PROGRESS NOTE ADULT - SUBJECTIVE AND OBJECTIVE BOX
INFECTIOUS DISEASES AND INTERNAL MEDICINE at [place]  =======================================================  Marcelo Beltre MD  Diplomates American Board of Internal Medicine and Infectious Diseases  Telephone [phone]  Fax            [phone]  =======================================================    SUETIFFANY FAY [id number]    Follow up:  COVID    Allergies:  No Known Allergies      Medications:  ampicillin/sulbactam  IVPB 1.5 Gram(s) IV Intermittent every 6 hours  ampicillin/sulbactam  IVPB      chlorhexidine 4% Liquid 1 Application(s) Topical <User Schedule>  cloZAPine 200 milliGRAM(s) Oral at bedtime  cloZAPine 25 milliGRAM(s) Oral at bedtime  dexAMETHasone  IVPB 10 milliGRAM(s) IV Intermittent daily  dexMEDEtomidine Infusion 0.2 MICROgram(s)/kG/Hr IV Continuous <Continuous>  doxycycline IVPB 100 milliGRAM(s) IV Intermittent every 12 hours  enoxaparin Injectable 40 milliGRAM(s) SubCutaneous daily  guaiFENesin  milliGRAM(s) Oral every 12 hours  haloperidol    Injectable 5 milliGRAM(s) IV Push every 6 hours PRN  losartan 100 milliGRAM(s) Oral at bedtime  melatonin 3 milliGRAM(s) Oral at bedtime PRN  morphine  - Injectable 2 milliGRAM(s) IV Push every 4 hours PRN  OLANZapine Injectable 5 milliGRAM(s) IntraMuscular every 6 hours  remdesivir  IVPB   IV Intermittent   remdesivir  IVPB 100 milliGRAM(s) IV Intermittent every 24 hours  valproic  acid Syrup 1000 milliGRAM(s) Oral at bedtime    SOCIAL       FAMILY   FAMILY HISTORY:    REVIEW OF SYSTEMS:  CONFUSED UNABLE TO OBTAIN ROS           Physical Exam:  IC Vital Signs Last 24 Hrs  T(C): 36.4 (31 Dec 2021 08:00), Max: 37.2 (30 Dec 2021 19:00)  T(F): 97.5 (31 Dec 2021 08:00), Max: 99 (30 Dec 2021 19:00)  HR: 83 (31 Dec 2021 08:00) (52 - 95)  BP: 153/87 (31 Dec 2021 08:00) (114/79 - 153/87)  BP(mean): 106 (31 Dec 2021 08:00) (65 - 111)  RR: 29 (31 Dec 2021 08:00) (19 - 34)  SpO2: 93% (31 Dec 2021 08:41) (89% - 100%)  GEN: NAD,   HEENT: normocephalic and atraumatic. EOMI. DARON.    NECK: Supple. No carotid bruits.  No lymphadenopathy or thyromegaly.  LUNGS: Clear to auscultation.  HEART: Regular rate and rhythm without murmur.  ABDOMEN: Soft, nontender, and nondistended.  Positive bowel sounds.    : No CVA tenderness  EXTREMITIES: Without any cyanosis, clubbing, rash, lesions or edema.  MSK: no joint swelling  NEUROLOGIC: Cranial nerves II through XII are grossly intact.  PSYCHIATRIC: Appropriate affect .  SKIN: No ulceration or induration present.        Labs:  Vitals:  ============  T(     =======================================================  Current Antibiotics:     remdesivir  IVPB   IV Intermittent   remdesivir  IVPB 100 milliGRAM(s) IV Intermittent every 24 hours    Other medications:  chlorhexidine 4% Liquid 1 Application(s) Topical <User Schedule>  cloZAPine 200 milliGRAM(s) Oral at bedtime  cloZAPine 25 milliGRAM(s) Oral at bedtime  dexAMETHasone  IVPB 10 milliGRAM(s) IV Intermittent daily  dexMEDEtomidine Infusion 0.2 MICROgram(s)/kG/Hr IV Continuous <Continuous>  enoxaparin Injectable 40 milliGRAM(s) SubCutaneous daily  guaiFENesin  milliGRAM(s) Oral every 12 hours  losartan 100 milliGRAM(s) Oral at bedtime  OLANZapine Injectable 5 milliGRAM(s) IntraMuscular every 6 hours  valproic  acid Syrup 1000 milliGRAM(s) Oral at bedtime      =======================================================  Labs:                                         11.3   12.07 )-----------( 273      ( 31 Dec 2021 04:47 )             35.0   12-31    138  |  99  |  19.2  ----------------------------<  189<H>  4.0   |  29.0  |  0.36<L>    Ca    8.9      31 Dec 2021 04:47  Phos  3.1     12-31  Mg     2.1     12-31    TPro  6.4<L>  /  Alb  3.5  /  TBili  0.3<L>  /  DBili  0.1  /  AST  26  /  ALT  32  /  AlkPhos  63  12-31      Culture - Urine (collected 12-25-21 @ 06:38)  Source: Clean Catch Clean Catch (Midstream)  Final Report (12-26-21 @ 12:44):    <10,000 CFU/mL Normal Urogenital [place]    Culture - Blood (collected 12-24-21 @ 22:36)  Source: .Blood Blood-Peripheral  Final Report (12-29-21 @ 23:00):    No growth at 5 days.    Culture - Blood (collected 12-24-21 @ 22:36)  Source: .Blood Blood-Peripheral  Final Report (12-29-21 @ 23:00):    No growth at 5 days.      Creatinine, Serum: 0.37 mg/dL (12-30-21 @ 05:24)  Creatinine, Serum: 0.39 mg/dL (12-29-21 @ 04:51)  Creatinine, Serum: 0.39 mg/dL (12-28-21 @ 03:29)  Creatinine, Serum: 0.34 mg/dL (12-27-21 @ 08:13)  Creatinine, Serum: 0.50 mg/dL (12-26-21 @ 19:34)            WBC Count: 15.26 K/uL (12-30-21 @ 05:24)  WBC Count: 11.67 K/uL (12-29-21 @ 04:51)  WBC Count: 10.66 K/uL (12-28-21 @ 03:29)  WBC Count: 10.66 K/uL (12-27-21 @ 08:13)  WBC Count: 11.79 K/uL (12-26-21 @ 19:34)    SARS-CoV-2: Detected (12-29-21 @ 09:31)  Rapid RVP Result: Detected (12-29-21 @ 09:31)  SARS-CoV-2: NotDetec (12-24-21 @ 22:35)  Rapid RVP Result: NotDetec (12-24-21 @ 22:35)        Alkaline Phosphatase, Serum: 69 U/L (12-30-21 @ 05:24)  Alkaline Phosphatase, Serum: 64 U/L (12-30-21 @ 05:24)  Alkaline Phosphatase, Serum: 65 U/L (12-29-21 @ 04:51)  Alkaline Phosphatase, Serum: 74 U/L (12-28-21 @ 03:29)  Alkaline Phosphatase, Serum: 80 U/L (12-27-21 @ 08:13)  Alkaline Phosphatase, Serum: 84 U/L (12-26-21 @ 19:34)  Alanine Aminotransferase (ALT/SGPT): 34 U/L (12-30-21 @ 05:24)  Alanine Aminotransferase (ALT/SGPT): 35 U/L (12-30-21 @ 05:24)  Alanine Aminotransferase (ALT/SGPT): 44 U/L (12-29-21 @ 04:51)  Alanine Aminotransferase (ALT/SGPT): 55 U/L (12-28-21 @ 03:29)  Alanine Aminotransferase (ALT/SGPT): 69 U/L (12-27-21 @ 08:13)  Alanine Aminotransferase (ALT/SGPT): 75 U/L (12-26-21 @ 19:34)  Aspartate Aminotransferase (AST/SGOT): 27 U/L (12-30-21 @ 05:24)  Aspartate Aminotransferase (AST/SGOT): 24 U/L (12-30-21 @ 05:24)  Aspartate Aminotransferase (AST/SGOT): 31 U/L (12-29-21 @ 04:51)  Aspartate Aminotransferase (AST/SGOT): 43 U/L (12-28-21 @ 03:29)  Aspartate Aminotransferase (AST/SGOT): 78 U/L (12-27-21 @ 08:13)  Aspartate Aminotransferase (AST/SGOT): 106 U/L (12-26-21 @ 19:34)  Bilirubin Total, Serum: 0.3 mg/dL (12-30-21 @ 05:24)  Bilirubin Total, Serum: 0.3 mg/dL (12-30-21 @ 05:24)  Bilirubin Total, Serum: 0.4 mg/dL (12-29-21 @ 04:51)  Bilirubin Total, Serum: 0.5 mg/dL (12-28-21 @ 03:29)  Bilirubin Total, Serum: 0.4 mg/dL (12-27-21 @ 08:13)  Bilirubin Total, Serum: 0.5 mg/dL (12-26-21 @ 19:34)  Bilirubin Direct, Serum: 0.1 mg/dL (12-30-21 @ 05:24)

## 2021-12-31 NOTE — CHART NOTE - NSCHARTNOTEFT_GEN_A_CORE
56M PMH schizophrenia, DM, obesity, HTN who presents with worsening R foot wound and cellulitis being treated with Doxycycline and Unasyn, with hospital course complicated by AMS, hyperactive delirium and needed Precedex infusion as well as NIPPV for hypoxia. Antibiotics  D/Basim on 12/30 per ID. F/w COVID-19 PNA. Will c/w Remdesivir, and s/p Actemra today. ID input appreciated. Decadron 10mg IV. Weaned off of Precedex infusion,and also BPAP to HFNC today;  C/w home psych meds - Clozaril, Depakene. Psychiatry on board, recommendations appreciated. Cleared bedside swallow eval and started on reg diet. Can be transferred to SDU. D/w Hospitalist team Dr. Lawson

## 2021-12-31 NOTE — PROGRESS NOTE ADULT - SUBJECTIVE AND OBJECTIVE BOX
TIFFANY HERCULES    [id number]    56y      Male    Patient is a 56y old  Male who presents with a chief complaint of foot wound (31 Dec 2021 10:04)      INTERVAL HPI/OVERNIGHT EVENTS:    Patient is poor historian, he looks comfortable, on high flow, as per nursing staff he is stable, no over night issues.     REVIEW OF SYSTEMS:    limited       Vital Signs Last 24 Hrs  T(C): 36.4 (31 Dec 2021 08:00), Max: 37.2 (30 Dec 2021 19:00)  T(F): 97.5 (31 Dec 2021 08:00), Max: 99 (30 Dec 2021 19:00)  HR: 83 (31 Dec 2021 08:00) (52 - 95)  BP: 153/87 (31 Dec 2021 08:00) (114/79 - 153/87)  BP(mean): 106 (31 Dec 2021 08:00) (65 - 106)  RR: 29 (31 Dec 2021 08:00) (19 - 34)  SpO2: 93% (31 Dec 2021 08:41) (89% - 99%)    PHYSICAL EXAM:    GENERAL: Middle age obese male looking comfortable  HEENT: has high flow cannula on   NECK: soft, Supple, No JVD  CHEST/LUNG: Decrease air entry bilaterally; No wheezing  HEART: S1S2+, Regular rate and rhythm; No murmurs  ABDOMEN: Soft, Nontender, Nondistended; Bowel sounds present  EXTREMITIES:  1+ Peripheral Pulses, No edema  SKIN: No rashes or lesions  NEURO: AAOX3, no focal deficits, no motor r sensory loss  PSYCH: normal mood.       LABS:                        11.3   12.07 )-----------( 273      ( 31 Dec 2021 04:47 )             35.0     12-31    138  |  99  |  19.2  ----------------------------<  189<H>  4.0   |  29.0  |  0.36<L>    Ca    8.9      31 Dec 2021 04:47  Phos  3.1     12-31  Mg     2.1     12-31    TPro  6.4<L>  /  Alb  3.5  /  TBili  0.3<L>  /  DBili  0.1  /  AST  26  /  ALT  32  /  AlkPhos  63  12-31    PT/INR - ( 31 Dec 2021 04:47 )   PT: 15.1 sec;   INR: 1.32 ratio                 I&O's Summary    30 Dec 2021 07:01  -  31 Dec 2021 07:00  --------------------------------------------------------  IN: 947.2 mL / OUT: 1120 mL / NET: -172.8 mL    31 Dec 2021 07:01  -  31 Dec 2021 12:14  --------------------------------------------------------  IN: 0 mL / OUT: 740 mL / NET: -740 mL        MEDICATIONS  (STANDING):  chlorhexidine 4% Liquid 1 Application(s) Topical <User Schedule>  cloZAPine 25 milliGRAM(s) Oral at bedtime  cloZAPine 200 milliGRAM(s) Oral at bedtime  dexAMETHasone  IVPB 10 milliGRAM(s) IV Intermittent daily  enoxaparin Injectable 40 milliGRAM(s) SubCutaneous daily  guaiFENesin  milliGRAM(s) Oral every 12 hours  losartan 100 milliGRAM(s) Oral at bedtime  OLANZapine Injectable 5 milliGRAM(s) IntraMuscular every 6 hours  remdesivir  IVPB   IV Intermittent   remdesivir  IVPB 100 milliGRAM(s) IV Intermittent every 24 hours  valproic acid 1000 milliGRAM(s) Oral at bedtime    MEDICATIONS  (PRN):  ALBUTerol    90 MICROgram(s) HFA Inhaler 1 Puff(s) Inhalation every 4 hours PRN SOB  haloperidol    Injectable 5 milliGRAM(s) IV Push every 6 hours PRN restelssness and agitation  melatonin 3 milliGRAM(s) Oral at bedtime PRN Sleep  morphine  - Injectable 2 milliGRAM(s) IV Push every 4 hours PRN tachypnea/anxiousness

## 2021-12-31 NOTE — CONSULT NOTE ADULT - ASSESSMENT
RECOMMENDATIONS:   Consistent with the ethical principle of beneficence, the clinical team must critically assess the efficacy of the therapies on a case by case basis to ascertain if a procedure will cause more harm than benefit the clinical team has a duty to appropriately offer measures that promote comfort.  At present, appropriate medical interventions are occurring at this time. All patients, such as Mr. Cr should have their advanced directives addressed.    The ethical analysis above affirms the clinical team’s decision to offer continued present medical management in attempt to restore his capacity. At present, patient remains without a surrogate. Two physicians can make major medical decisions. As patient falls under the auspices of [facility], ANY MOLST DIRECTIVES INCLUDING DNR AND DNI, MUST FOLLOW PROPER PROCEDURE AND BE REVIEWED WITHOUT OBJECTION (APPROVED) BY [facility] LEGAL SERVICES (MOLST CHECKLIST #4).     PATIENT MUST REMAIN FULL RESUSCITATION.    Thank you for including the Ethics Consultation Service in this challenging case. Ethics commends the team for their virtue in the care and support for Mr. Cr.  ETHICS SERVICE WILL REMAIN AVAILABLE FOR FURTHER CONVERSATION ABOUT THE PATIENT’S CURRENT CONDITION AND DECISION-POINTS THAT MAY OCCUR.  CASE DISCUSSED with  ETHICS ATTENDING: Sarah Milligan MS, MD, MPH, [facility].  More than 50% of the time of this consultation was spent in coordination of Care of Patient.	  REFERENCES:   MINDI CARDENAS. THE FAMILY HEALTH CARE DECISIONS ACT. [facility] HEALTH LAW JOURNAL,2010;15:32-35.  2 DENA DUEÑAS (2001). A GLOSSARY FOR SOCIAL EPIDEMIOLOGY. J EPIDEMIOL COMMUNITY HEALTH, 55(10): 693–700.

## 2021-12-31 NOTE — PROGRESS NOTE ADULT - ASSESSMENT
56 yr old male from [facility] on the [place] grounds with medical history of Schizo, DM, Obesity, HTN was sent in for right foot wound, which he states was noted yesterday. He does not recall trauma. However in ED, the wound appears > 1 day old and incidentally noted pulm infiltrates and nodule on imaging. Denies fevers, dyspnea, cough.   takes inhalers.   AS ABOVE PT WITH RIGHT FOOT WOUND UNCLEAR ETIOLOGY   WILL FOLLOW UP BLOOD CX   FOOT APPEARS IMPROVED  UNCLEAR IF TRUE INFECTION   PT  WITH AGITATION ON PRECEDEX WAS ON BIPAP NOW ON HIFLO  PT ALSO WITH COVID POS PCR AND STARTED ON REMDESIVIR AND DECADRON  PT  WAS USING BIPAP  S/P ACTEMRA  NOW N HI SHELLIE  WILL FOLLOWUP WITH LOUISA RECOMMENDATIONS

## 2021-12-31 NOTE — CONSULT NOTE ADULT - SUBJECTIVE AND OBJECTIVE BOX
Consult requested by: POLLY Pavon MD ROLE: Attending Service: Medicine  CURRENT ATTENDING: MIHAELA Chen MD, Critical Care  OTHER MD: EDDIE Richardson MD, Behavioral Health Attending    Consultant: Sarah Milligan MS, MD, MPH, [facility] Medical Ethicist Contact # [phone] (C) [phone] and Adina Schwab, [facility] (Ethics Fellow) Contact # [phone].    Consult purpose: To assist in the ethical dilemma posed by a 56-year-old male with schizophrenia who presented with a foot wound, now with COVID 19 pneumonia and a concerning lung nodule, regarding goals of care and unclear surrogacy.    Clinical summary: This is a 56-year-old unbefriended male with past medical history of schizophrenia (prior [facility] inpatient psychiatric admission in 2014), diabetes, hypertension, obesity, COVID VACCINATED brought in by EMS to the ED from [facility] ([facility] for shortness of breath and right foot wound. On evaluation, it is noted that patient stated his is always SOB and endorses cough. Patient also stated that wound started the day prior to admission. It is noted that ED physician spoke with RONALD Tam at [facility] regarding wound on right ankle, unclear how long it has been there. Also noted that the staff noticed he was more lethargic and had more slurred speech than usual and seemed like he had more difficulty breathing but unclear if it is his baseline as he does not usually take care of him. CXR in ED shows a mild infiltrate in the anterior right upper lobe and at left base, which are new since July 24, 2014. Also noted is another density the right upper outer lung field which would support pneumonic etiology. CT Angio revealed a 3.9 x 2.8 cm nodular right upper lobe opacity highly suspicious for malignancy and associated right hilar adenopathy as well as hazy ground glass opacities in the left upper lobe and tree-in-bud nodular ground glass opacities in the left lower lobe, likely infectious in etiology and no pulmonary embolism. CT Head with no CT evidence of acute intracranial pathology. COVID19 PCR negative on admission. It is noted the ED physician discussed CT results with patient who is able to verbalize diagnosis but unclear if patient has clear understanding. Patient admitted to Medicine for further treatment and management of right foot wound and pneumonia. Behavioral Health, Wound Care, Podiatry and ID on consult. Behavioral Health evaluation ongoing at this time. On 12/28/21, patient became increasingly more agitated, anxious and combative towards staff, pulling out IVs and pulling off his O2. Patient became hypoxic and more restless/delirious and required medication with valium, haldol, ativan, benadryl, and zyprexa. He became hypotensive and required 1L bolus and levophed drip. Patient was still combative and a danger to self and staff. Patient was subsequently transferred to MICU for further treatment and management. He was started on Precedex drip, to able to tolerate NIPPV. COVID19 PCR now POSITIVE receiving Remdesivir, decadron and Acterma. Ethics consult was initiated to assist the team in proper procedures regarding an appropriate decision maker as well goals of care decisions in a patient with mental illness given the patient’s current state.      PROGNOSIS ESTIMATE (SURVIVAL IN HRS, DAYS, WKS, MOS, YRS): guarded with prognostication in progress  PATIENT DECISION-MAKING CAPACITY:  Has Capacity   Patient Lacks Capacity due to current acute illness  DIAGNOSIS:  Yes  No  Unknown         PROGNOSIS:  Yes  No  Unknown  NAME OF MEDICAL DECISION-MAKER SHOULD PATIENT LACK CAPACITY (RELATIONSHIP): None  ROLE:  Health Care Agent  Legal Surrogate 	CONTACT#:   OTHER DECISION-MAKER (I.E., [facility] OR SURROGATE) AWARE OF:   DIAGNOSIS:  Yes  No  Unknown 		PROGNOSIS:  Yes  No  Unknown  OTHER STAKEHOLDERS: None  EVIDENCE OF PATIENT’S PREFERENCE OF LIFE-SUSTAINING TREATMENT (WRITTEN OR ORAL): None  RESUSCITATION STATUS: DNR:  Yes  No  DNI: Yes  No    Discussions:   Discussion between KAUR Milligan MD (Ethics Attending) and POLLY Pavon MD (Medicine Attending) on 12/28/2021: Case discussed. Patient is from [facility]. Came in with foot wound and found to have pneumonia and new lung nodule concerning for malignancy. Awaiting  consult. Patient possibly without surrogate. CAREN was investigating.   Chart Review on 12/28/21: Per CAREN note, she spoke with the residence at [phone] and they confirmed patient has no next of kin, POA OR HCP. It is noted that patient has an ICM Sparkle [phone], and CAREN spoke with Sparkle who confirmed he has a mother in [place] with dementia and alcohol abuse. Sparkle is his main contact as ICM.    Discussion between KAUR Milligan MD (Ethics Attending) and EDDIE Richardson MD (Psychiatry Attending) on 12/29/2021: Case discussed. Ethics investigating surrogacy. At present it appears that patient is unbefriended with questionable capacity and has a concerning lung nodule. Dr. Richardson to reassess today.   Discussion between Mary (Adina Schwab, Ethics Fellow) and Monica Espinoza LCSW () on 12/29/2021: Monica spoke with the patients Intensive  (ICM) who stated she has known the patient for the last 7 years. He is mostly independent, goes about the community, cooks his own meals, makes his own decisions, but does have a . He is not aggressive or behavioral, and has been calm, cooperative, and appropriate for the last 7 years that she knows him. His current condition is a change from his baseline. The only known contact is a mom, no other known family, but the [facility] does not have a contact for the mom and said the patient would have the contact. And if the patient spoke to his mom he called her on his own. The mother is 86 years old with dementia and alcohol abuse, last living in [place], so unclear of her current condition.  Chart review on 12/29/21: No further information on additional family or a contact number for the patient’s mother.  Discussion between KAUR Milligan MD (Ethics Attending) and EDDIE Richardson MD (Psychiatry Attending) on 12/29/2021: Reviewed collateral obtained. [facility], making decisions, not his baseline.   Discussion between KAUR Milligan MD (Ethics Attending) and MIHAELA Chen MD (Medicine Attending) on 12/29/2021: Patient now COVID19 positive. Reviewed [facility] status: at present, patient remains without a surrogate. Two physicians can make major medical decisions. Any decisions regarding MOLST directives must be approved by [facility] Legal Services. Patient remains full resuscitation.   BIOETHICS ANALYSIS: THE CENTRAL ETHICAL ISSUES PRESENTED IN THIS CASE ARE A) RESPECTING PATIENT AUTONOMY AND B) PROVIDER BENEFICENCE/NON-MALEFICENCE AND JUSTICE.  The conflict that exists in this situation is one hospital clinicians infrequently encounter in patients without family members or surrogates to share decisions. Mr. Cr is mentally ill and presently does not have a surrogate nor legal guardian.  Medical decision making for patients with neither decision-making capacity (DMC) nor a surrogate decision-maker presents an ethical challenge for healthcare providers because there is no way to obtain informed consent for treatment. This is particularly so when these decisions involve invasive/life-threatening procedures or withholding or withdrawing life-sustaining treatments and providing appropriate palliative care. In this case, the health team is commended for their virtue and invoking justice – by providing fairness and equitable care to while attempting to locate a surrogate. Here, it appears there is no surrogate to assist the medical team using the shared decision-making model to determine the level of care going forward. Prior to his hospitalization, Mr. Cr was able to make his own decisions and the goal would be to restore his capacity.  The bioethical principle of beneficence calls on clinicians to respect patient autonomy and to honor and preserve Mr. Cr’s sense of dignity and personhood, his moral status. Clinicians may variably argue that beneficence calls for further aggressive interventions. Beneficence aims to promote the best possible health or quality of living or dying: with aggressive interventions when these help prolong life with "good quality", with comfort care when LST’s are not possible and the aim is to achieve the best "quality of dying." The clinician’s duty to non-maleficence means avoiding medical interventions whose risk and burden exceeds their benefit ("first do no harm"). As this patient lacks capacity, it is appropriate for protection of his autonomy.  The 2010 [facility] Family Health Care Decisions Act (FHDCA)1 addresses the situation of a sick individual who lacks capacity and has no available surrogate. The DCA requires hospitals, after a patient is admitted, to determine if the patient has a health care agent, guardian, or a person who can serve as the patient’s surrogate. If the patient has no such person and lacks capacity, the hospital must identify, to the extent practical, the patient’s wishes and preferences about pending health care decisions. WHERE THE PATIENT CONTINUES TO BE INCAPACITATED THE ATTENDING PHYSICIAN MAY AUTHORIZE MAJOR MEDICAL TREATMENT AFTER CONSULTATION AND CONCURRENCE WITH THE PATIENT’S HEALTH CARE TEAM AND AN INDEPENDENT PHYSICIAN NOT INVOLVED WITH THE PATIENT’S CARE CONCURS WITH THE TREATMENT. In this case, the patient has a guarded prognosis and is currently incapacitated from making medical decisions. There is no known family or other surrogate decision makers at this time.   At present the patient’s condition is guarded, presently with COVID19 pneumonia. To ensure there is no bias and nondiscrimination on this gentleman who has mental illness and disabled and without surrogates; objective criteria must be used to withhold intubation, resuscitation, nutrition and hydration.  As he is under the auspices of [facility], specific protocols must be followed for advanced directives including DNR/DNI. Any MOLST directives must be initiated with [facility] Legal Services before any directives are finalized. Patient must meet [facility] criteria for withholding and/or withdrawing LSTs including DNR/DNI.   Lastly, social justice, sometimes referred to as distributive justice, refers to the equitable distribution and utilization of treatment and resources that maximizes benefit to society and the respect to vulnerable populations, while considering the vulnerability of patients with special needs. In this case, particular attention needs to be paid to certain social determinants of health (SDOH) including psychiatric illness, health literacy, and potential social and/or moral health disparity as a result. Social determinants of health refer to both specific features of and pathways by which societal conditions affect health and that potentially can be altered by informed action.(2) Additionally, in the case of those with psychiatric illness, a vulnerable population, special legal protections are aimed at ensuring that the aforementioned moral status of such persons is respected, and that decisions to withhold or withdraw life sustaining treatment are truly concordant with the patient’s best interest, and that anti-mental health bias — conscious or otherwise — does not enter into care decisions, especially those as consequential as withholding or withdrawing life sustaining treatment.

## 2021-12-31 NOTE — PROGRESS NOTE ADULT - ASSESSMENT
57 y/o male with PMH of schizophrenia, DM, obesity, HTN who was sent from [facility] on the [place] for worsening R foot wound and cellulitis; he was initially admitted to medical floor; treated with Doxycycline and Unasyn, ID consulted. Patient's hospital course was complicated with altered mental status, hyperactive delirium and hypoxia. He was placed on Precedex infusion for agitation and NIPPV for hypoxia. Antibiotics  D/Basim on 12/30 per ID. Covid positive, he received 1 dose of Actemra, on Decadron and Remdesivir. Patient now off Precedex, transitioned to HF; downgraded to medical floor for further management. Psych also on board.     Acute respiratory failure with hypoxia due to covid   Off BiPAP, on HF   Continue Decadron, monitor glucose   Remdesivir, monitor renal and hepatic function   Isolation protocol   Tylenol PRN for fever/pain   ID following     RLE cellulitis   Resolved   No longer on antibiotic     Acute psychosis/delirium   Now off Precedex   On constant observation   Continue psych medications:  Valproic 1000mg HS   Haldol 5mg PRN q6h   Olanzapine 5mg q6h  Clozapine 225mg HS with holding parameters   Psych on board     HTN  Losartan 100mg     Supportive   DVT prophylaxis: Lovenox  Diet: DASH     Plan of care discussed with patient and MICU nurse at bed side

## 2021-12-31 NOTE — PROGRESS NOTE ADULT - ASSESSMENT
55 y/o male with PMH of schizophrenia, DM, obesity, HTN who was sent from [facility] on the [place] for worsening R foot wound and cellulitis; he was initially admitted to medical floor; treated with Doxycycline and Unasyn, ID consulted. Patient's hospital course was complicated with altered mental status, hyperactive delirium and hypoxia. He was placed on Precedex infusion for agitation and NIPPV for hypoxia. Antibiotics  D/Basim on 12/30 per ID. Covid positive, he received 1 dose of Actemra, on Decadron and Remdesivir. Patient now off Precedex, transitioned to HF; downgraded to medical floor for further management. Psych also on board.     Plan:     Acute respiratory failure with hypoxia due to covid   Off BiPAP, on HF, will continue with wean slowly as tolerated  Continue Decadron, monitor glucose   Remdesivir, monitor renal and hepatic function   Isolation protocol   Tylenol PRN for fever/pain   ID following  repeat labs ever 48hours       GGOs, tree in the bud opacities on imaging, had leucocytosis= CAP  possible post obstructive, Blood c/s sent came negative   s/p Levaquin, c/w Doxycyline, his covid test came positive so bacterial pneumonia was ruled out.   -Mucinex  on CT scan he was noted to have 3.9 x 2.8 cm nodular right upper lobe opacity on imaging= highly suspicious for malignancy  -patient does not appear to understand his underlying medication condition and address the lung findings with him  -spoke with , patient does not seen to have any HCP/next of kin, under care of a   -Psych and ethics has seen and follow.     # Ankle wound, now with worsening surrounding skin erythema  -change to doxy to cover MRSA  -A1C 6.8  -wound care consulted, jj recse   cellulitis Resolved, No longer on antibiotic     Acute psychosis/delirium:    Now off Precedex   On constant observation   Continue psych medications:  Valproic 1000mg HS   Haldol 5mg PRN q6h   Olanzapine 5mg q6h  Clozapine 225mg HS with holding parameters   Psych on board.     HTN:   Losartan 100mg     Supportive   DVT prophylaxis: Lovenox  Diet: DASH         # DM2   -HgbA1c 6.8  -Lantus, premeal, ISS here    # HTN controlled  Cont Cozaar    Lovenox    Dispo: decision on work up of malignancy  Jj ID, psych and ethics following for final input.

## 2021-12-31 NOTE — PROGRESS NOTE ADULT - SUBJECTIVE AND OBJECTIVE BOX
57 y/o male with PMH of schizophrenia, DM, obesity, HTN who was sent from [facility] on the [place] for worsening R foot wound and cellulitis; he was initially admitted to medical floor; treated with Doxycycline and Unasyn, ID consulted. Patient's hospital course was complicated with altered mental status, hyperactive delirium and hypoxia. He was placed on Precedex infusion for agitation and NIPPV for hypoxia. Antibiotics  D/Basim on 12/30 per ID. Covid positive, he received 1 dose of Actemra, on Decadron and Remdesivir. Patient now off Precedex, transitioned to HF; downgraded to medical floor for further management. Psych also on board.     Patient seen and examined at bed side, complaining about the scab on his right heel.     PAST MEDICAL & SURGICAL HISTORY:  Schizophrenia    No significant past surgical history      REVIEW OF SYSTEMS: see note    MEDICATIONS  (STANDING):  chlorhexidine 4% Liquid 1 Application(s) Topical <User Schedule>  cloZAPine 200 milliGRAM(s) Oral at bedtime  cloZAPine 25 milliGRAM(s) Oral at bedtime  dexAMETHasone  IVPB 10 milliGRAM(s) IV Intermittent daily  enoxaparin Injectable 40 milliGRAM(s) SubCutaneous daily  guaiFENesin  milliGRAM(s) Oral every 12 hours  losartan 100 milliGRAM(s) Oral at bedtime  OLANZapine Injectable 5 milliGRAM(s) IntraMuscular every 6 hours  remdesivir  IVPB   IV Intermittent   remdesivir  IVPB 100 milliGRAM(s) IV Intermittent every 24 hours  valproic acid 1000 milliGRAM(s) Oral at bedtime    MEDICATIONS  (PRN):  ALBUTerol    90 MICROgram(s) HFA Inhaler 1 Puff(s) Inhalation every 4 hours PRN SOB  haloperidol    Injectable 5 milliGRAM(s) IV Push every 6 hours PRN restelssness and agitation  melatonin 3 milliGRAM(s) Oral at bedtime PRN Sleep  morphine  - Injectable 2 milliGRAM(s) IV Push every 4 hours PRN tachypnea/anxiousness      Allergies: No Known Allergies    SOCIAL HISTORY: lives in [facility] [place] the [place]    FAMILY HISTORY: unable to obtain     Vital Signs Last 24 Hrs  T(C): 35.6 (31 Dec 2021 00:18), Max: 37.2 (30 Dec 2021 19:00)  T(F): 96.1 (31 Dec 2021 00:18), Max: 99 (30 Dec 2021 19:00)  HR: 83 (31 Dec 2021 02:00) (52 - 86)  BP: 114/79 (31 Dec 2021 02:00) (104/69 - 153/78)  BP(mean): 89 (31 Dec 2021 02:00) (65 - 114)  RR: 25 (31 Dec 2021 02:00) (13 - 34)  SpO2: 91% (31 Dec 2021 02:00) (91% - 100%)    PHYSICAL EXAM:      Constitutional: well groomed, not in acute distress     Eyes: PERRLA     Respiratory: on HF, able to speak in full sentence, CTA b/l    Cardiovascular: s1, s2, RRR    Gastrointestinal: soft, non-tender, BS+     Extremities: no edema     Neurological: awake, alert and oriented to self, patient interactive, moving all extremities     Skin: scab noticed on the right heel     Musculoskeletal: ROM intact     Psychiatric: calm, talking in tangent         LABS:                        10.8   15.26 )-----------( 258      ( 30 Dec 2021 05:24 )             34.4     12-30    139  |  102  |  13.6  ----------------------------<  192<H>  4.7   |  29.0  |  0.37<L>    Ca    8.8      30 Dec 2021 05:24  Phos  4.3     12-30  Mg     2.3     12-30    TPro  6.6  /  Alb  3.4  /  TBili  0.3<L>  /  DBili  0.1  /  AST  27  /  ALT  34  /  AlkPhos  69  12-30    PT/INR - ( 30 Dec 2021 05:24 )   PT: 14.1 sec;   INR: 1.23 ratio

## 2022-01-01 ENCOUNTER — EMERGENCY (EMERGENCY)
Facility: HOSPITAL | Age: 57
LOS: 1 days | End: 2022-01-01
Attending: EMERGENCY MEDICINE
Payer: COMMERCIAL

## 2022-01-01 VITALS — TEMPERATURE: 97 F

## 2022-01-01 LAB
ALBUMIN SERPL ELPH-MCNC: 3.4 G/DL — SIGNIFICANT CHANGE UP (ref 3.3–5.2)
ALP SERPL-CCNC: 63 U/L — SIGNIFICANT CHANGE UP (ref 40–120)
ALT FLD-CCNC: 31 U/L — SIGNIFICANT CHANGE UP
ANION GAP SERPL CALC-SCNC: 10 MMOL/L — SIGNIFICANT CHANGE UP (ref 5–17)
AST SERPL-CCNC: 22 U/L — SIGNIFICANT CHANGE UP
BASOPHILS # BLD AUTO: 0.04 K/UL — SIGNIFICANT CHANGE UP (ref 0–0.2)
BASOPHILS NFR BLD AUTO: 0.4 % — SIGNIFICANT CHANGE UP (ref 0–2)
BILIRUB DIRECT SERPL-MCNC: 0.1 MG/DL — SIGNIFICANT CHANGE UP (ref 0–0.3)
BILIRUB INDIRECT FLD-MCNC: 0.2 MG/DL — SIGNIFICANT CHANGE UP (ref 0.2–1)
BILIRUB SERPL-MCNC: 0.3 MG/DL — LOW (ref 0.4–2)
BUN SERPL-MCNC: 10.2 MG/DL — SIGNIFICANT CHANGE UP (ref 8–20)
CALCIUM SERPL-MCNC: 8.9 MG/DL — SIGNIFICANT CHANGE UP (ref 8.6–10.2)
CHLORIDE SERPL-SCNC: 98 MMOL/L — SIGNIFICANT CHANGE UP (ref 98–107)
CO2 SERPL-SCNC: 32 MMOL/L — HIGH (ref 22–29)
CREAT SERPL-MCNC: 0.38 MG/DL — LOW (ref 0.5–1.3)
EOSINOPHIL # BLD AUTO: 0.17 K/UL — SIGNIFICANT CHANGE UP (ref 0–0.5)
EOSINOPHIL NFR BLD AUTO: 1.8 % — SIGNIFICANT CHANGE UP (ref 0–6)
GLUCOSE SERPL-MCNC: 158 MG/DL — HIGH (ref 70–99)
HCT VFR BLD CALC: 39 % — SIGNIFICANT CHANGE UP (ref 39–50)
HGB BLD-MCNC: 12.1 G/DL — LOW (ref 13–17)
IMM GRANULOCYTES NFR BLD AUTO: 0.3 % — SIGNIFICANT CHANGE UP (ref 0–1.5)
LYMPHOCYTES # BLD AUTO: 2.54 K/UL — SIGNIFICANT CHANGE UP (ref 1–3.3)
LYMPHOCYTES # BLD AUTO: 26.9 % — SIGNIFICANT CHANGE UP (ref 13–44)
MCHC RBC-ENTMCNC: 27.4 PG — SIGNIFICANT CHANGE UP (ref 27–34)
MCHC RBC-ENTMCNC: 31 GM/DL — LOW (ref 32–36)
MCV RBC AUTO: 88.4 FL — SIGNIFICANT CHANGE UP (ref 80–100)
MONOCYTES # BLD AUTO: 0.92 K/UL — HIGH (ref 0–0.9)
MONOCYTES NFR BLD AUTO: 9.7 % — SIGNIFICANT CHANGE UP (ref 2–14)
NEUTROPHILS # BLD AUTO: 5.74 K/UL — SIGNIFICANT CHANGE UP (ref 1.8–7.4)
NEUTROPHILS NFR BLD AUTO: 60.9 % — SIGNIFICANT CHANGE UP (ref 43–77)
PLATELET # BLD AUTO: 277 K/UL — SIGNIFICANT CHANGE UP (ref 150–400)
POTASSIUM SERPL-MCNC: 4.2 MMOL/L — SIGNIFICANT CHANGE UP (ref 3.5–5.3)
POTASSIUM SERPL-SCNC: 4.2 MMOL/L — SIGNIFICANT CHANGE UP (ref 3.5–5.3)
PROT SERPL-MCNC: 6.4 G/DL — LOW (ref 6.6–8.7)
RBC # BLD: 4.41 M/UL — SIGNIFICANT CHANGE UP (ref 4.2–5.8)
RBC # FLD: 14 % — SIGNIFICANT CHANGE UP (ref 10.3–14.5)
SODIUM SERPL-SCNC: 140 MMOL/L — SIGNIFICANT CHANGE UP (ref 135–145)
WBC # BLD: 9.44 K/UL — SIGNIFICANT CHANGE UP (ref 3.8–10.5)
WBC # FLD AUTO: 9.44 K/UL — SIGNIFICANT CHANGE UP (ref 3.8–10.5)

## 2022-01-01 PROCEDURE — 92950 HEART/LUNG RESUSCITATION CPR: CPT

## 2022-01-01 PROCEDURE — 99233 SBSQ HOSP IP/OBS HIGH 50: CPT

## 2022-01-01 PROCEDURE — 99285 EMERGENCY DEPT VISIT HI MDM: CPT | Mod: 25

## 2022-01-01 PROCEDURE — 0225U NFCT DS DNA&RNA 21 SARSCOV2: CPT

## 2022-01-01 PROCEDURE — 99281 EMR DPT VST MAYX REQ PHY/QHP: CPT

## 2022-01-01 PROCEDURE — 99285 EMERGENCY DEPT VISIT HI MDM: CPT

## 2022-01-01 RX ADMIN — CLOZAPINE 200 MILLIGRAM(S): 150 TABLET, ORALLY DISINTEGRATING ORAL at 21:33

## 2022-01-01 RX ADMIN — OLANZAPINE 5 MILLIGRAM(S): 15 TABLET, FILM COATED ORAL at 14:02

## 2022-01-01 RX ADMIN — OLANZAPINE 5 MILLIGRAM(S): 15 TABLET, FILM COATED ORAL at 21:35

## 2022-01-01 RX ADMIN — CHLORHEXIDINE GLUCONATE 1 APPLICATION(S): 213 SOLUTION TOPICAL at 06:35

## 2022-01-01 RX ADMIN — Medication 1000 MILLIGRAM(S): at 21:32

## 2022-01-01 RX ADMIN — LOSARTAN POTASSIUM 100 MILLIGRAM(S): 100 TABLET, FILM COATED ORAL at 21:34

## 2022-01-01 RX ADMIN — CLOZAPINE 25 MILLIGRAM(S): 150 TABLET, ORALLY DISINTEGRATING ORAL at 21:33

## 2022-01-01 RX ADMIN — Medication 600 MILLIGRAM(S): at 18:02

## 2022-01-01 RX ADMIN — REMDESIVIR 500 MILLIGRAM(S): 5 INJECTION INTRAVENOUS at 23:25

## 2022-01-01 RX ADMIN — Medication 102 MILLIGRAM(S): at 06:51

## 2022-01-01 RX ADMIN — ENOXAPARIN SODIUM 40 MILLIGRAM(S): 100 INJECTION SUBCUTANEOUS at 14:03

## 2022-01-01 RX ADMIN — Medication 600 MILLIGRAM(S): at 06:14

## 2022-01-01 RX ADMIN — OLANZAPINE 5 MILLIGRAM(S): 15 TABLET, FILM COATED ORAL at 06:14

## 2022-01-01 NOTE — PROGRESS NOTE ADULT - SUBJECTIVE AND OBJECTIVE BOX
TIFFANY HERCULES  ----------------------------------------  The patient was seen at bedside. Patient with COVID-19 pneumonia. Offers no complaints. Tolerating high flow nasal oxygen.    Vital Signs Last 24 Hrs  T(C): 36.8 (01 Jan 2022 14:00), Max: 36.8 (01 Jan 2022 10:34)  T(F): 98.3 (01 Jan 2022 14:00), Max: 98.3 (01 Jan 2022 10:34)  HR: 92 (01 Jan 2022 14:00) (9 - 94)  BP: 143/81 (01 Jan 2022 14:00) (124/67 - 164/84)  BP(mean): 94 (01 Jan 2022 14:00) (72 - 118)  RR: 20 (01 Jan 2022 14:00) (15 - 33)  SpO2: 95% (01 Jan 2022 14:00) (94% - 100%)    PHYSICAL EXAMINATION:  ----------------------------------------  General appearance: No acute distress, Awake, Alert  HEENT: Normocephalic, Atraumatic, Conjunctiva clear, EOMI  Neck: Supple, No JVD, No tenderness  Lungs: Breath sound equal bilaterally, No wheezes, No rales  Cardiovascular: S1S2, Regular rhythm  Abdomen: Soft, Nontender, Nondistended, No guarding/rebound, Positive bowel sounds  Extremities: No clubbing, No cyanosis, No edema, No calf tenderness. Right ankle/foot scabs  Neuro: Strength equal bilaterally, No tremors  Psychiatric: Appropriate mood, Normal affect    LABORATORY STUDIES:  ----------------------------------------             12.1   9.44  )-----------( 277      ( 01 Jan 2022 05:26 )             39.0     01-01    140  |  98  |  10.2  ----------------------------<  158<H>  4.2   |  32.0<H>  |  0.38<L>    Ca    8.9      01 Jan 2022 05:26  Phos  3.1     12-31  Mg     2.1     12-31    TPro  6.4<L>  /  Alb  3.4  /  TBili  0.3<L>  /  DBili  0.1  /  AST  22  /  ALT  31  /  AlkPhos  63  01-01    LIVER FUNCTIONS - ( 01 Jan 2022 05:26 )  Alb: 3.4 g/dL / Pro: 6.4 g/dL / ALK PHOS: 63 U/L / ALT: 31 U/L / AST: 22 U/L / GGT: x           PT/INR - ( 31 Dec 2021 04:47 )   PT: 15.1 sec;   INR: 1.32 ratio      MEDICATIONS  (STANDING):  chlorhexidine 4% Liquid 1 Application(s) Topical <User Schedule>  cloZAPine 200 milliGRAM(s) Oral at bedtime  cloZAPine 25 milliGRAM(s) Oral at bedtime  dexAMETHasone  IVPB 10 milliGRAM(s) IV Intermittent daily  enoxaparin Injectable 40 milliGRAM(s) SubCutaneous daily  guaiFENesin  milliGRAM(s) Oral every 12 hours  losartan 100 milliGRAM(s) Oral at bedtime  OLANZapine Injectable 5 milliGRAM(s) IntraMuscular every 6 hours  remdesivir  IVPB   IV Intermittent   remdesivir  IVPB 100 milliGRAM(s) IV Intermittent every 24 hours  valproic acid 1000 milliGRAM(s) Oral at bedtime    MEDICATIONS  (PRN):  ALBUTerol    90 MICROgram(s) HFA Inhaler 1 Puff(s) Inhalation every 4 hours PRN SOB  haloperidol    Injectable 5 milliGRAM(s) IV Push every 6 hours PRN restelssness and agitation  melatonin 3 milliGRAM(s) Oral at bedtime PRN Sleep  morphine  - Injectable 2 milliGRAM(s) IV Push every 4 hours PRN tachypnea/anxiousness      ASSESSMENT / PLAN:  ----------------------------------------  55 y/o male with PMH of schizophrenia, DM, obesity, HTN who was sent from [facility] on the [place] for worsening R foot wound and cellulitis; he was initially admitted to medical floor; treated with Doxycycline and Unasyn, ID consulted. Patient's hospital course was complicated with altered mental status, hyperactive delirium and hypoxia. He was placed on Precedex infusion for agitation and NIPPV for hypoxia. Antibiotics  D/Basim on 12/30 per ID. Covid positive, he received 1 dose of Actemra, on Decadron and Remdesivir. Patient now off Precedex, transitioned to ; downgraded to medical floor for further management. Psych also on board.     Acute hypoxic respiratory failure / COVID-19 pneumonia - On high flow nasal oxygen. Titration of supplemental oxygen as tolerated. On remdesivir and dexamethasone (day#3).    Pneumonia - The patient was previously treated with various antibiotics.    Schizophrenia - On clozapine, olanzapine, and valproic acid.    Ankle wound - Previously treated with doxycycline.    Diabetes - Insulin coverage, close monitoring of blood glucose levels.    Hypertension - Close blood pressure monitoring. On losartan.

## 2022-01-02 LAB
ALBUMIN SERPL ELPH-MCNC: 3.4 G/DL — SIGNIFICANT CHANGE UP (ref 3.3–5.2)
ALP SERPL-CCNC: 67 U/L — SIGNIFICANT CHANGE UP (ref 40–120)
ALT FLD-CCNC: 26 U/L — SIGNIFICANT CHANGE UP
ANION GAP SERPL CALC-SCNC: 15 MMOL/L — SIGNIFICANT CHANGE UP (ref 5–17)
AST SERPL-CCNC: 17 U/L — SIGNIFICANT CHANGE UP
BILIRUB DIRECT SERPL-MCNC: 0.1 MG/DL — SIGNIFICANT CHANGE UP (ref 0–0.3)
BILIRUB INDIRECT FLD-MCNC: 0.3 MG/DL — SIGNIFICANT CHANGE UP (ref 0.2–1)
BILIRUB SERPL-MCNC: 0.4 MG/DL — SIGNIFICANT CHANGE UP (ref 0.4–2)
BUN SERPL-MCNC: 9.6 MG/DL — SIGNIFICANT CHANGE UP (ref 8–20)
CALCIUM SERPL-MCNC: 9.4 MG/DL — SIGNIFICANT CHANGE UP (ref 8.6–10.2)
CHLORIDE SERPL-SCNC: 97 MMOL/L — LOW (ref 98–107)
CO2 SERPL-SCNC: 27 MMOL/L — SIGNIFICANT CHANGE UP (ref 22–29)
CREAT SERPL-MCNC: 0.39 MG/DL — LOW (ref 0.5–1.3)
D DIMER BLD IA.RAPID-MCNC: 212 NG/ML DDU — SIGNIFICANT CHANGE UP
GLUCOSE SERPL-MCNC: 217 MG/DL — HIGH (ref 70–99)
HCT VFR BLD CALC: 42.7 % — SIGNIFICANT CHANGE UP (ref 39–50)
HGB BLD-MCNC: 13.7 G/DL — SIGNIFICANT CHANGE UP (ref 13–17)
INR BLD: 1.15 RATIO — SIGNIFICANT CHANGE UP (ref 0.88–1.16)
MCHC RBC-ENTMCNC: 27.9 PG — SIGNIFICANT CHANGE UP (ref 27–34)
MCHC RBC-ENTMCNC: 32.1 GM/DL — SIGNIFICANT CHANGE UP (ref 32–36)
MCV RBC AUTO: 87 FL — SIGNIFICANT CHANGE UP (ref 80–100)
PLATELET # BLD AUTO: 292 K/UL — SIGNIFICANT CHANGE UP (ref 150–400)
POTASSIUM SERPL-MCNC: 4.7 MMOL/L — SIGNIFICANT CHANGE UP (ref 3.5–5.3)
POTASSIUM SERPL-SCNC: 4.7 MMOL/L — SIGNIFICANT CHANGE UP (ref 3.5–5.3)
PROT SERPL-MCNC: 6.7 G/DL — SIGNIFICANT CHANGE UP (ref 6.6–8.7)
PROTHROM AB SERPL-ACNC: 13.2 SEC — SIGNIFICANT CHANGE UP (ref 10.6–13.6)
RBC # BLD: 4.91 M/UL — SIGNIFICANT CHANGE UP (ref 4.2–5.8)
RBC # FLD: 13.9 % — SIGNIFICANT CHANGE UP (ref 10.3–14.5)
SODIUM SERPL-SCNC: 139 MMOL/L — SIGNIFICANT CHANGE UP (ref 135–145)
WBC # BLD: 10.15 K/UL — SIGNIFICANT CHANGE UP (ref 3.8–10.5)
WBC # FLD AUTO: 10.15 K/UL — SIGNIFICANT CHANGE UP (ref 3.8–10.5)

## 2022-01-02 PROCEDURE — 99233 SBSQ HOSP IP/OBS HIGH 50: CPT

## 2022-01-02 RX ORDER — ENOXAPARIN SODIUM 100 MG/ML
40 INJECTION SUBCUTANEOUS
Refills: 0 | Status: DISCONTINUED | OUTPATIENT
Start: 2022-01-02 | End: 2022-01-06

## 2022-01-02 RX ADMIN — CLOZAPINE 25 MILLIGRAM(S): 150 TABLET, ORALLY DISINTEGRATING ORAL at 22:20

## 2022-01-02 RX ADMIN — ENOXAPARIN SODIUM 40 MILLIGRAM(S): 100 INJECTION SUBCUTANEOUS at 17:03

## 2022-01-02 RX ADMIN — REMDESIVIR 500 MILLIGRAM(S): 5 INJECTION INTRAVENOUS at 22:00

## 2022-01-02 RX ADMIN — CLOZAPINE 200 MILLIGRAM(S): 150 TABLET, ORALLY DISINTEGRATING ORAL at 22:21

## 2022-01-02 RX ADMIN — Medication 102 MILLIGRAM(S): at 06:16

## 2022-01-02 RX ADMIN — LOSARTAN POTASSIUM 100 MILLIGRAM(S): 100 TABLET, FILM COATED ORAL at 22:20

## 2022-01-02 RX ADMIN — Medication 1000 MILLIGRAM(S): at 22:21

## 2022-01-02 RX ADMIN — CHLORHEXIDINE GLUCONATE 1 APPLICATION(S): 213 SOLUTION TOPICAL at 06:08

## 2022-01-02 RX ADMIN — OLANZAPINE 5 MILLIGRAM(S): 15 TABLET, FILM COATED ORAL at 17:03

## 2022-01-02 RX ADMIN — Medication 600 MILLIGRAM(S): at 17:03

## 2022-01-02 RX ADMIN — OLANZAPINE 5 MILLIGRAM(S): 15 TABLET, FILM COATED ORAL at 12:24

## 2022-01-02 NOTE — PROGRESS NOTE ADULT - SUBJECTIVE AND OBJECTIVE BOX
TIFFANY HERCULES  ----------------------------------------  The patient was seen at bedside. Patient with acute hypoxic respiratory failure and COVID-19 pneumonia. Reported episodes of cough. Denied chest pain.    Vital Signs Last 24 Hrs  T(C): 36.7 (02 Jan 2022 08:00), Max: 37.1 (01 Jan 2022 18:00)  T(F): 98.1 (02 Jan 2022 08:00), Max: 98.7 (01 Jan 2022 18:00)  HR: 86 (02 Jan 2022 10:17) (76 - 93)  BP: 115/75 (02 Jan 2022 08:00) (115/75 - 148/82)  BP(mean): 90 (02 Jan 2022 08:00) (71 - 101)  RR: 25 (02 Jan 2022 08:00) (20 - 30)  SpO2: 95% (02 Jan 2022 10:17) (93% - 98%)    PHYSICAL EXAMINATION:  ----------------------------------------  General appearance: No acute distress, Awake, Alert  HEENT: Normocephalic, Atraumatic, Conjunctiva clear, EOMI  Neck: Supple, No JVD, No tenderness  Lungs: Breath sound equal bilaterally, No wheezes, No rales  Cardiovascular: S1S2, Regular rhythm  Abdomen: Soft, Nontender, Nondistended, No guarding/rebound, Positive bowel sounds  Extremities: No clubbing, No cyanosis, No edema, No calf tenderness. Right ankle/foot scabs  Neuro: Strength equal bilaterally, No tremors  Psychiatric: Appropriate mood, Normal affect    LABORATORY STUDIES:  ----------------------------------------             13.7   10.15 )-----------( 292      ( 02 Jan 2022 09:21 )             42.7     01-02    139  |  97<L>  |  9.6  ----------------------------<  217<H>  4.7   |  27.0  |  0.39<L>    Ca    9.4      02 Jan 2022 09:21    TPro  6.7  /  Alb  3.4  /  TBili  0.4  /  DBili  0.1  /  AST  17  /  ALT  26  /  AlkPhos  67  01-02    LIVER FUNCTIONS - ( 02 Jan 2022 09:21 )  Alb: 3.4 g/dL / Pro: 6.7 g/dL / ALK PHOS: 67 U/L / ALT: 26 U/L / AST: 17 U/L / GGT: x           PT/INR - ( 02 Jan 2022 09:21 )   PT: 13.2 sec;   INR: 1.15 ratio      MEDICATIONS  (STANDING):  chlorhexidine 4% Liquid 1 Application(s) Topical <User Schedule>  cloZAPine 200 milliGRAM(s) Oral at bedtime  cloZAPine 25 milliGRAM(s) Oral at bedtime  dexAMETHasone  IVPB 10 milliGRAM(s) IV Intermittent daily  enoxaparin Injectable 40 milliGRAM(s) SubCutaneous daily  guaiFENesin  milliGRAM(s) Oral every 12 hours  losartan 100 milliGRAM(s) Oral at bedtime  OLANZapine Injectable 5 milliGRAM(s) IntraMuscular every 6 hours  remdesivir  IVPB   IV Intermittent   remdesivir  IVPB 100 milliGRAM(s) IV Intermittent every 24 hours  valproic acid 1000 milliGRAM(s) Oral at bedtime    MEDICATIONS  (PRN):  ALBUTerol    90 MICROgram(s) HFA Inhaler 1 Puff(s) Inhalation every 4 hours PRN SOB  haloperidol    Injectable 5 milliGRAM(s) IV Push every 6 hours PRN restelssness and agitation  melatonin 3 milliGRAM(s) Oral at bedtime PRN Sleep  morphine  - Injectable 2 milliGRAM(s) IV Push every 4 hours PRN tachypnea/anxiousness      ASSESSMENT / PLAN:  ----------------------------------------  55 y/o male with PMH of schizophrenia, DM, obesity, HTN who was sent from [facility] on the [place] for worsening R foot wound and cellulitis; he was initially admitted to medical floor; treated with Doxycycline and Unasyn, ID consulted. Patient's hospital course was complicated with altered mental status, hyperactive delirium and hypoxia. He was placed on Precedex infusion for agitation and NIPPV for hypoxia. Antibiotics  D/Basim on 12/30 per ID. Covid positive, he received 1 dose of Actemra, on Decadron and Remdesivir. Patient now off Precedex, transitioned to HF; downgraded to medical floor for further management. Psych also on board.     Acute hypoxic respiratory failure / COVID-19 pneumonia - On high flow nasal oxygen with further titration as tolerated. On remdesivir and dexamethasone (day#4). Prone positioning as tolerated. Enoxaparin for venous thromboembolism prophylaxis.    Pneumonia - The patient was previously treated with a course of antibiotics. Leukocytosis resolved.    Schizophrenia - On clozapine, olanzapine, and valproic acid.    Ankle wound - Previously treated with doxycycline. No significant erythema or drainage from the site.    Diabetes - Insulin coverage, close monitoring of blood glucose levels.    Hypertension - Close blood pressure monitoring. On losartan. TIFFANY HERCULES  ----------------------------------------  The patient was seen at bedside. Patient with acute hypoxic respiratory failure and COVID-19 pneumonia. Reported episodes of cough. Denied chest pain.    Vital Signs Last 24 Hrs  T(C): 36.7 (02 Jan 2022 08:00), Max: 37.1 (01 Jan 2022 18:00)  T(F): 98.1 (02 Jan 2022 08:00), Max: 98.7 (01 Jan 2022 18:00)  HR: 86 (02 Jan 2022 10:17) (76 - 93)  BP: 115/75 (02 Jan 2022 08:00) (115/75 - 148/82)  BP(mean): 90 (02 Jan 2022 08:00) (71 - 101)  RR: 25 (02 Jan 2022 08:00) (20 - 30)  SpO2: 95% (02 Jan 2022 10:17) (93% - 98%)    PHYSICAL EXAMINATION:  ----------------------------------------  General appearance: No acute distress, Awake, Alert  HEENT: Normocephalic, Atraumatic, Conjunctiva clear, EOMI  Neck: Supple, No JVD, No tenderness  Lungs: Breath sound equal bilaterally, No wheezes, No rales  Cardiovascular: S1S2, Regular rhythm  Abdomen: Soft, Nontender, Nondistended, No guarding/rebound, Positive bowel sounds  Extremities: No clubbing, No cyanosis, No edema, No calf tenderness. Right ankle/foot scabs  Neuro: Strength equal bilaterally, No tremors  Psychiatric: Appropriate mood, Normal affect    LABORATORY STUDIES:  ----------------------------------------             13.7   10.15 )-----------( 292      ( 02 Jan 2022 09:21 )             42.7     01-02    139  |  97<L>  |  9.6  ----------------------------<  217<H>  4.7   |  27.0  |  0.39<L>    Ca    9.4      02 Jan 2022 09:21    TPro  6.7  /  Alb  3.4  /  TBili  0.4  /  DBili  0.1  /  AST  17  /  ALT  26  /  AlkPhos  67  01-02    LIVER FUNCTIONS - ( 02 Jan 2022 09:21 )  Alb: 3.4 g/dL / Pro: 6.7 g/dL / ALK PHOS: 67 U/L / ALT: 26 U/L / AST: 17 U/L / GGT: x           PT/INR - ( 02 Jan 2022 09:21 )   PT: 13.2 sec;   INR: 1.15 ratio      MEDICATIONS  (STANDING):  chlorhexidine 4% Liquid 1 Application(s) Topical <User Schedule>  cloZAPine 200 milliGRAM(s) Oral at bedtime  cloZAPine 25 milliGRAM(s) Oral at bedtime  dexAMETHasone  IVPB 10 milliGRAM(s) IV Intermittent daily  enoxaparin Injectable 40 milliGRAM(s) SubCutaneous daily  guaiFENesin  milliGRAM(s) Oral every 12 hours  losartan 100 milliGRAM(s) Oral at bedtime  OLANZapine Injectable 5 milliGRAM(s) IntraMuscular every 6 hours  remdesivir  IVPB   IV Intermittent   remdesivir  IVPB 100 milliGRAM(s) IV Intermittent every 24 hours  valproic acid 1000 milliGRAM(s) Oral at bedtime    MEDICATIONS  (PRN):  ALBUTerol    90 MICROgram(s) HFA Inhaler 1 Puff(s) Inhalation every 4 hours PRN SOB  haloperidol    Injectable 5 milliGRAM(s) IV Push every 6 hours PRN restelssness and agitation  melatonin 3 milliGRAM(s) Oral at bedtime PRN Sleep  morphine  - Injectable 2 milliGRAM(s) IV Push every 4 hours PRN tachypnea/anxiousness      ASSESSMENT / PLAN:  ----------------------------------------  55 y/o male with PMH of schizophrenia, DM, obesity, HTN who was sent from [facility] on the [place] for worsening R foot wound and cellulitis; he was initially admitted to medical floor; treated with Doxycycline and Unasyn, ID consulted. Patient's hospital course was complicated with altered mental status, hyperactive delirium and hypoxia. He was placed on Precedex infusion for agitation and NIPPV for hypoxia. Antibiotics  D/Basim on 12/30 per ID. Covid positive, he received 1 dose of Actemra, on Decadron and Remdesivir. Patient now off Precedex, transitioned to HF; downgraded to medical floor for further management. Psych also on board.     Acute hypoxic respiratory failure / COVID-19 pneumonia - On high flow nasal oxygen with further titration as tolerated. On remdesivir and dexamethasone (day#4). Prone positioning as tolerated. D-dimer was not elevated. On enoxaparin for venous thromboembolism prophylaxis.    Pneumonia - The patient was previously treated with a course of antibiotics. Leukocytosis resolved.    Schizophrenia - On clozapine, olanzapine, and valproic acid.    Ankle wound - Previously treated with doxycycline. No significant erythema or drainage from the site.    Diabetes - Insulin coverage, close monitoring of blood glucose levels.    Hypertension - Close blood pressure monitoring. On losartan.

## 2022-01-03 PROCEDURE — 99232 SBSQ HOSP IP/OBS MODERATE 35: CPT

## 2022-01-03 RX ORDER — COLLAGENASE CLOSTRIDIUM HIST. 250 UNIT/G
1 OINTMENT (GRAM) TOPICAL DAILY
Refills: 0 | Status: DISCONTINUED | OUTPATIENT
Start: 2022-01-03 | End: 2022-01-06

## 2022-01-03 RX ADMIN — OLANZAPINE 5 MILLIGRAM(S): 15 TABLET, FILM COATED ORAL at 06:07

## 2022-01-03 RX ADMIN — Medication 1000 MILLIGRAM(S): at 21:58

## 2022-01-03 RX ADMIN — CHLORHEXIDINE GLUCONATE 1 APPLICATION(S): 213 SOLUTION TOPICAL at 06:14

## 2022-01-03 RX ADMIN — ENOXAPARIN SODIUM 40 MILLIGRAM(S): 100 INJECTION SUBCUTANEOUS at 17:55

## 2022-01-03 RX ADMIN — CLOZAPINE 25 MILLIGRAM(S): 150 TABLET, ORALLY DISINTEGRATING ORAL at 21:57

## 2022-01-03 RX ADMIN — OLANZAPINE 5 MILLIGRAM(S): 15 TABLET, FILM COATED ORAL at 21:09

## 2022-01-03 RX ADMIN — Medication 102 MILLIGRAM(S): at 06:08

## 2022-01-03 RX ADMIN — Medication 600 MILLIGRAM(S): at 06:06

## 2022-01-03 RX ADMIN — OLANZAPINE 5 MILLIGRAM(S): 15 TABLET, FILM COATED ORAL at 14:30

## 2022-01-03 RX ADMIN — ENOXAPARIN SODIUM 40 MILLIGRAM(S): 100 INJECTION SUBCUTANEOUS at 06:10

## 2022-01-03 RX ADMIN — OLANZAPINE 5 MILLIGRAM(S): 15 TABLET, FILM COATED ORAL at 00:15

## 2022-01-03 RX ADMIN — CLOZAPINE 200 MILLIGRAM(S): 150 TABLET, ORALLY DISINTEGRATING ORAL at 21:57

## 2022-01-03 RX ADMIN — LOSARTAN POTASSIUM 100 MILLIGRAM(S): 100 TABLET, FILM COATED ORAL at 21:57

## 2022-01-03 RX ADMIN — Medication 600 MILLIGRAM(S): at 17:55

## 2022-01-03 NOTE — PROGRESS NOTE ADULT - ASSESSMENT
56 yr old male from [facility] on the [place] with medical history of Schizo, DM, Obesity, HTN was sent in for right foot wound, which he states was noted yesterday. He does not recall trauma. However in ED, the wound appears > 1 day old and incidentally noted pulm infiltrates and nodule on imaging. Denies fevers, dyspnea, cough.   takes inhalers.   AS ABOVE PT WITH RIGHT FOOT WOUND UNCLEAR ETIOLOGY   WILL FOLLOW UP BLOOD CX   FOOT APPEARS IMPROVED  NO OBVIOUS INFECTION    CONTINUE LOCAL CARE    PT ALSO WITH COVID POS PCR AND COMPLETED O  REMDESIVIR AND DECADRON  PT NOW ON   ROOM AIR  WILL FOLLOWUP AS  NEEDED PLEASE CALL IF QUESTIONS

## 2022-01-03 NOTE — PROGRESS NOTE ADULT - SUBJECTIVE AND OBJECTIVE BOX
TIFFANY HERCULES  ----------------------------------------  The patient was seen at bedside. Patient with COVID-19 pneumonia and hypoxia. Offers no complaints.    Vital Signs Last 24 Hrs  T(C): 36.9 (03 Jan 2022 04:00), Max: 36.9 (03 Jan 2022 04:00)  T(F): 98.5 (03 Jan 2022 04:00), Max: 98.5 (03 Jan 2022 04:00)  HR: 77 (03 Jan 2022 04:00) (77 - 96)  BP: 118/64 (03 Jan 2022 04:00) (110/78 - 150/78)  BP(mean): 77 (03 Jan 2022 04:00) (77 - 109)  RR: 20 (03 Jan 2022 04:00) (20 - 22)  SpO2: 96% (03 Jan 2022 04:00) (94% - 96%)    PHYSICAL EXAMINATION:  ----------------------------------------  General appearance: No acute distress, Awake, Alert  HEENT: Normocephalic, Atraumatic, Conjunctiva clear, EOMI  Neck: Supple, No JVD, No tenderness  Lungs: Breath sound equal bilaterally, No wheezes, No rales  Cardiovascular: S1S2, Regular rhythm  Abdomen: Soft, Nontender, Nondistended, No guarding/rebound, Positive bowel sounds  Extremities: No clubbing, No cyanosis, No edema, No calf tenderness. Right ankle/foot dressing in place  Neuro: Strength equal bilaterally, No tremors  Psychiatric: Appropriate mood, Normal affect    LABORATORY STUDIES:  ----------------------------------------             13.7   10.15 )-----------( 292      ( 02 Jan 2022 09:21 )             42.7     01-02    139  |  97<L>  |  9.6  ----------------------------<  217<H>  4.7   |  27.0  |  0.39<L>    Ca    9.4      02 Jan 2022 09:21    TPro  6.7  /  Alb  3.4  /  TBili  0.4  /  DBili  0.1  /  AST  17  /  ALT  26  /  AlkPhos  67  01-02    LIVER FUNCTIONS - ( 02 Jan 2022 09:21 )  Alb: 3.4 g/dL / Pro: 6.7 g/dL / ALK PHOS: 67 U/L / ALT: 26 U/L / AST: 17 U/L / GGT: x           PT/INR - ( 02 Jan 2022 09:21 )   PT: 13.2 sec;   INR: 1.15 ratio      MEDICATIONS  (STANDING):  chlorhexidine 4% Liquid 1 Application(s) Topical <User Schedule>  cloZAPine 200 milliGRAM(s) Oral at bedtime  cloZAPine 25 milliGRAM(s) Oral at bedtime  dexAMETHasone  IVPB 10 milliGRAM(s) IV Intermittent daily  enoxaparin Injectable 40 milliGRAM(s) SubCutaneous two times a day  guaiFENesin  milliGRAM(s) Oral every 12 hours  losartan 100 milliGRAM(s) Oral at bedtime  OLANZapine Injectable 5 milliGRAM(s) IntraMuscular every 6 hours  valproic acid 1000 milliGRAM(s) Oral at bedtime    MEDICATIONS  (PRN):  ALBUTerol    90 MICROgram(s) HFA Inhaler 1 Puff(s) Inhalation every 4 hours PRN SOB  haloperidol    Injectable 5 milliGRAM(s) IV Push every 6 hours PRN restelssness and agitation  melatonin 3 milliGRAM(s) Oral at bedtime PRN Sleep  morphine  - Injectable 2 milliGRAM(s) IV Push every 4 hours PRN tachypnea/anxiousness      ASSESSMENT / PLAN:  ----------------------------------------  57 y/o male with PMH of schizophrenia, DM, obesity, HTN who was sent from [facility] on the [place] for worsening R foot wound and cellulitis; he was initially admitted to medical floor; treated with Doxycycline and Unasyn, ID consulted. Patient's hospital course was complicated with altered mental status, hyperactive delirium and hypoxia. He was placed on Precedex infusion for agitation and NIPPV for hypoxia. Antibiotics  D/Basim on 12/30 per ID. Covid positive, he received 1 dose of Actemra, on Decadron and Remdesivir. Patient now off Precedex, transitioned to HF; downgraded to medical floor for further management.    Acute hypoxic respiratory failure / COVID-19 pneumonia - On remdesivir and dexamethasone (day#5). Prone positioning as tolerated. D-dimer was not elevated. On enoxaparin for venous thromboembolism prophylaxis. Hypoxia improved and supplemental oxygen discontinued. To continue to monitor clinically.    Pneumonia - The patient was previously treated with a course of antibiotics. Leukocytosis resolved and afebrile.    Schizophrenia - On clozapine, olanzapine, and valproic acid.    Ankle wound - Previously treated with doxycycline. Continue with local wound care.    Diabetes - Insulin coverage, close monitoring of blood glucose levels.    Hypertension - Close blood pressure monitoring. On losartan.

## 2022-01-03 NOTE — PROGRESS NOTE ADULT - SUBJECTIVE AND OBJECTIVE BOX
INFECTIOUS DISEASES AND INTERNAL MEDICINE at [place]  =======================================================  Marcelo Beltre MD  Diplomates American Board of Internal Medicine and Infectious Diseases  Telephone [phone]  Fax            [phone]  =======================================================    SUETIFFANY FAY [id number]    Follow up:  COVID    Allergies:  No Known Allergies      Medications:  ampicillin/sulbactam  IVPB 1.5 Gram(s) IV Intermittent every 6 hours  ampicillin/sulbactam  IVPB      chlorhexidine 4% Liquid 1 Application(s) Topical <User Schedule>  cloZAPine 200 milliGRAM(s) Oral at bedtime  cloZAPine 25 milliGRAM(s) Oral at bedtime  dexAMETHasone  IVPB 10 milliGRAM(s) IV Intermittent daily  dexMEDEtomidine Infusion 0.2 MICROgram(s)/kG/Hr IV Continuous <Continuous>  doxycycline IVPB 100 milliGRAM(s) IV Intermittent every 12 hours  enoxaparin Injectable 40 milliGRAM(s) SubCutaneous daily  guaiFENesin  milliGRAM(s) Oral every 12 hours  haloperidol    Injectable 5 milliGRAM(s) IV Push every 6 hours PRN  losartan 100 milliGRAM(s) Oral at bedtime  melatonin 3 milliGRAM(s) Oral at bedtime PRN  morphine  - Injectable 2 milliGRAM(s) IV Push every 4 hours PRN  OLANZapine Injectable 5 milliGRAM(s) IntraMuscular every 6 hours  remdesivir  IVPB   IV Intermittent   remdesivir  IVPB 100 milliGRAM(s) IV Intermittent every 24 hours  valproic  acid Syrup 1000 milliGRAM(s) Oral at bedtime    SOCIAL       FAMILY   FAMILY HISTORY:    REVIEW OF SYSTEMS:  CONFUSED UNABLE TO OBTAIN ROS           Physical Exam:  I Vital Signs Last 24 Hrs  T(C): 36.2 (03 Jan 2022 16:08), Max: 36.9 (03 Jan 2022 04:00)  T(F): 97.1 (03 Jan 2022 16:08), Max: 98.5 (03 Jan 2022 04:00)  HR: 97 (03 Jan 2022 16:08) (77 - 110)  BP: 131/100 (03 Jan 2022 16:08) (118/64 - 150/78)  BP(mean): 108 (03 Jan 2022 16:08) (77 - 108)  RR: 23 (03 Jan 2022 16:08) (19 - 23)  SpO2: 95% (03 Jan 2022 16:08) (94% - 96%)    GEN: NAD,   HEENT: normocephalic and atraumatic. EOMI. DARON.    NECK: Supple. No carotid bruits.  No lymphadenopathy or thyromegaly.  LUNGS: Clear to auscultation.  HEART: Regular rate and rhythm without murmur.  ABDOMEN: Soft, nontender, and nondistended.  Positive bowel sounds.    : No CVA tenderness  EXTREMITIES: Without any cyanosis, clubbing, rash, lesions or edema.  MSK: no joint swelling  NEUROLOGIC: Cranial nerves II through XII are grossly intact.  PSYCHIATRIC: Appropriate affect .  SKIN: No ulceration or induration present.        Labs:  Vitals:  ============  T(     =======================================================  Current Antibiotics:     remdesivir  IVPB   IV Intermittent   remdesivir  IVPB 100 milliGRAM(s) IV Intermittent every 24 hours    Other medications:  chlorhexidine 4% Liquid 1 Application(s) Topical <User Schedule>  cloZAPine 200 milliGRAM(s) Oral at bedtime  cloZAPine 25 milliGRAM(s) Oral at bedtime  dexAMETHasone  IVPB 10 milliGRAM(s) IV Intermittent daily  dexMEDEtomidine Infusion 0.2 MICROgram(s)/kG/Hr IV Continuous <Continuous>  enoxaparin Injectable 40 milliGRAM(s) SubCutaneous daily  guaiFENesin  milliGRAM(s) Oral every 12 hours  losartan 100 milliGRAM(s) Oral at bedtime  OLANZapine Injectable 5 milliGRAM(s) IntraMuscular every 6 hours  valproic  acid Syrup 1000 milliGRAM(s) Oral at bedtime      =======================================================  Labs:                                                 13.7   10.15 )-----------( 292      ( 02 Jan 2022 09:21 )             42.7   01-02    139  |  97<L>  |  9.6  ----------------------------<  217<H>  4.7   |  27.0  |  0.39<L>    Ca    9.4      02 Jan 2022 09:21    TPro  6.7  /  Alb  3.4  /  TBili  0.4  /  DBili  0.1  /  AST  17  /  ALT  26  /  AlkPhos  67  01-02      Culture - Urine (collected 12-25-21 @ 06:38)  Source: Clean Catch Clean Catch (Midstream)  Final Report (12-26-21 @ 12:44):    <10,000 CFU/mL Normal Urogenital [place]    Culture - Blood (collected 12-24-21 @ 22:36)  Source: .Blood Blood-Peripheral  Final Report (12-29-21 @ 23:00):    No growth at 5 days.    Culture - Blood (collected 12-24-21 @ 22:36)  Source: .Blood Blood-Peripheral  Final Report (12-29-21 @ 23:00):    No growth at 5 days.      Creatinine, Serum: 0.37 mg/dL (12-30-21 @ 05:24)  Creatinine, Serum: 0.39 mg/dL (12-29-21 @ 04:51)  Creatinine, Serum: 0.39 mg/dL (12-28-21 @ 03:29)  Creatinine, Serum: 0.34 mg/dL (12-27-21 @ 08:13)  Creatinine, Serum: 0.50 mg/dL (12-26-21 @ 19:34)            WBC Count: 15.26 K/uL (12-30-21 @ 05:24)  WBC Count: 11.67 K/uL (12-29-21 @ 04:51)  WBC Count: 10.66 K/uL (12-28-21 @ 03:29)  WBC Count: 10.66 K/uL (12-27-21 @ 08:13)  WBC Count: 11.79 K/uL (12-26-21 @ 19:34)    SARS-CoV-2: Detected (12-29-21 @ 09:31)  Rapid RVP Result: Detected (12-29-21 @ 09:31)  SARS-CoV-2: NotDetec (12-24-21 @ 22:35)  Rapid RVP Result: NotDetec (12-24-21 @ 22:35)        Alkaline Phosphatase, Serum: 69 U/L (12-30-21 @ 05:24)  Alkaline Phosphatase, Serum: 64 U/L (12-30-21 @ 05:24)  Alkaline Phosphatase, Serum: 65 U/L (12-29-21 @ 04:51)  Alkaline Phosphatase, Serum: 74 U/L (12-28-21 @ 03:29)  Alkaline Phosphatase, Serum: 80 U/L (12-27-21 @ 08:13)  Alkaline Phosphatase, Serum: 84 U/L (12-26-21 @ 19:34)  Alanine Aminotransferase (ALT/SGPT): 34 U/L (12-30-21 @ 05:24)  Alanine Aminotransferase (ALT/SGPT): 35 U/L (12-30-21 @ 05:24)  Alanine Aminotransferase (ALT/SGPT): 44 U/L (12-29-21 @ 04:51)  Alanine Aminotransferase (ALT/SGPT): 55 U/L (12-28-21 @ 03:29)  Alanine Aminotransferase (ALT/SGPT): 69 U/L (12-27-21 @ 08:13)  Alanine Aminotransferase (ALT/SGPT): 75 U/L (12-26-21 @ 19:34)  Aspartate Aminotransferase (AST/SGOT): 27 U/L (12-30-21 @ 05:24)  Aspartate Aminotransferase (AST/SGOT): 24 U/L (12-30-21 @ 05:24)  Aspartate Aminotransferase (AST/SGOT): 31 U/L (12-29-21 @ 04:51)  Aspartate Aminotransferase (AST/SGOT): 43 U/L (12-28-21 @ 03:29)  Aspartate Aminotransferase (AST/SGOT): 78 U/L (12-27-21 @ 08:13)  Aspartate Aminotransferase (AST/SGOT): 106 U/L (12-26-21 @ 19:34)  Bilirubin Total, Serum: 0.3 mg/dL (12-30-21 @ 05:24)  Bilirubin Total, Serum: 0.3 mg/dL (12-30-21 @ 05:24)  Bilirubin Total, Serum: 0.4 mg/dL (12-29-21 @ 04:51)  Bilirubin Total, Serum: 0.5 mg/dL (12-28-21 @ 03:29)  Bilirubin Total, Serum: 0.4 mg/dL (12-27-21 @ 08:13)  Bilirubin Total, Serum: 0.5 mg/dL (12-26-21 @ 19:34)  Bilirubin Direct, Serum: 0.1 mg/dL (12-30-21 @ 05:24)

## 2022-01-04 LAB
ALBUMIN SERPL ELPH-MCNC: 3.5 G/DL — SIGNIFICANT CHANGE UP (ref 3.3–5.2)
ALP SERPL-CCNC: 62 U/L — SIGNIFICANT CHANGE UP (ref 40–120)
ALT FLD-CCNC: 21 U/L — SIGNIFICANT CHANGE UP
AST SERPL-CCNC: 13 U/L — SIGNIFICANT CHANGE UP
BILIRUB DIRECT SERPL-MCNC: 0.1 MG/DL — SIGNIFICANT CHANGE UP (ref 0–0.3)
BILIRUB INDIRECT FLD-MCNC: 0.2 MG/DL — SIGNIFICANT CHANGE UP (ref 0.2–1)
BILIRUB SERPL-MCNC: 0.3 MG/DL — LOW (ref 0.4–2)
CREAT SERPL-MCNC: 0.38 MG/DL — LOW (ref 0.5–1.3)
INR BLD: 1.06 RATIO — SIGNIFICANT CHANGE UP (ref 0.88–1.16)
PROT SERPL-MCNC: 6.2 G/DL — LOW (ref 6.6–8.7)
PROTHROM AB SERPL-ACNC: 12.3 SEC — SIGNIFICANT CHANGE UP (ref 10.6–13.6)

## 2022-01-04 PROCEDURE — 36000 PLACE NEEDLE IN VEIN: CPT

## 2022-01-04 PROCEDURE — 76937 US GUIDE VASCULAR ACCESS: CPT | Mod: 26

## 2022-01-04 PROCEDURE — 99232 SBSQ HOSP IP/OBS MODERATE 35: CPT

## 2022-01-04 RX ADMIN — CLOZAPINE 25 MILLIGRAM(S): 150 TABLET, ORALLY DISINTEGRATING ORAL at 22:51

## 2022-01-04 RX ADMIN — Medication 600 MILLIGRAM(S): at 06:35

## 2022-01-04 RX ADMIN — LOSARTAN POTASSIUM 100 MILLIGRAM(S): 100 TABLET, FILM COATED ORAL at 22:22

## 2022-01-04 RX ADMIN — Medication 102 MILLIGRAM(S): at 12:36

## 2022-01-04 RX ADMIN — OLANZAPINE 5 MILLIGRAM(S): 15 TABLET, FILM COATED ORAL at 03:45

## 2022-01-04 RX ADMIN — CLOZAPINE 200 MILLIGRAM(S): 150 TABLET, ORALLY DISINTEGRATING ORAL at 22:21

## 2022-01-04 RX ADMIN — ENOXAPARIN SODIUM 40 MILLIGRAM(S): 100 INJECTION SUBCUTANEOUS at 17:51

## 2022-01-04 RX ADMIN — ENOXAPARIN SODIUM 40 MILLIGRAM(S): 100 INJECTION SUBCUTANEOUS at 06:36

## 2022-01-04 RX ADMIN — Medication 1 APPLICATION(S): at 12:36

## 2022-01-04 RX ADMIN — Medication 600 MILLIGRAM(S): at 17:51

## 2022-01-04 NOTE — PHYSICAL THERAPY INITIAL EVALUATION ADULT - GENERAL OBSERVATIONS, REHAB EVAL
pt received sitting OOB in chair + IV lock +  + 1:1, breathing on RA, A&OX4, NAD & willing to participate with PT.

## 2022-01-04 NOTE — PHYSICAL THERAPY INITIAL EVALUATION ADULT - STAIR PATTERN, REHAB EVAL
pt performed step ups on step stool at bedside. pt reports feeling fatigued and tired post stair training and requesting to return to chair. SpO2: 96% on RA/step to step

## 2022-01-04 NOTE — PROCEDURE NOTE - ADDITIONAL PROCEDURE DETAILS
old ecchymosis noted on right lower forearm. No pain or tenderness per Patient. Tourniquet removed, bed lowered, sharps disposed of. Patient tolerated well.

## 2022-01-04 NOTE — PHYSICAL THERAPY INITIAL EVALUATION ADULT - ADDITIONAL COMMENTS
pt is a questionable historian. pt reports he lives in a room on [facility] and has one full flight upstairs to room with bilat handrails. pt reports no DME at home and able to drive.

## 2022-01-04 NOTE — PROGRESS NOTE ADULT - SUBJECTIVE AND OBJECTIVE BOX
TIFFANY HERCULES  ----------------------------------------  The patient was seen at bedside. Patient with COVID-19 pneumonia. Offers no complaints.    Vital Signs Last 24 Hrs  T(C): 36.3 (04 Jan 2022 08:00), Max: 36.3 (04 Jan 2022 08:00)  T(F): 97.3 (04 Jan 2022 08:00), Max: 97.3 (04 Jan 2022 08:00)  HR: 84 (04 Jan 2022 08:00) (84 - 97)  BP: 135/85 (04 Jan 2022 08:00) (122/83 - 135/85)  BP(mean): 108 (03 Jan 2022 16:08) (108 - 108)  RR: 20 (04 Jan 2022 08:00) (18 - 23)  SpO2: 93% (04 Jan 2022 08:00) (92% - 95%)    CAPILLARY BLOOD GLUCOSE        PHYSICAL EXAMINATION:  ----------------------------------------      LABORATORY STUDIES:  ----------------------------------------    01-04    x   |  x   |  x   ----------------------------<  x   x    |  x   |  0.38<L>      TPro  6.2<L>  /  Alb  3.5  /  TBili  0.3<L>  /  DBili  0.1  /  AST  13  /  ALT  21  /  AlkPhos  62  01-04    LIVER FUNCTIONS - ( 04 Jan 2022 07:58 )  Alb: 3.5 g/dL / Pro: 6.2 g/dL / ALK PHOS: 62 U/L / ALT: 21 U/L / AST: 13 U/L / GGT: x           PT/INR - ( 04 Jan 2022 07:58 )   PT: 12.3 sec;   INR: 1.06 ratio                         MEDICATIONS  (STANDING):  chlorhexidine 4% Liquid 1 Application(s) Topical <User Schedule>  cloZAPine 200 milliGRAM(s) Oral at bedtime  cloZAPine 25 milliGRAM(s) Oral at bedtime  collagenase Ointment 1 Application(s) Topical daily  dexAMETHasone  IVPB 10 milliGRAM(s) IV Intermittent daily  enoxaparin Injectable 40 milliGRAM(s) SubCutaneous two times a day  guaiFENesin  milliGRAM(s) Oral every 12 hours  losartan 100 milliGRAM(s) Oral at bedtime  OLANZapine Injectable 5 milliGRAM(s) IntraMuscular every 6 hours  valproic acid 1000 milliGRAM(s) Oral at bedtime    MEDICATIONS  (PRN):  ALBUTerol    90 MICROgram(s) HFA Inhaler 1 Puff(s) Inhalation every 4 hours PRN SOB  haloperidol    Injectable 5 milliGRAM(s) IV Push every 6 hours PRN restelssness and agitation  melatonin 3 milliGRAM(s) Oral at bedtime PRN Sleep  morphine  - Injectable 2 milliGRAM(s) IV Push every 4 hours PRN tachypnea/anxiousness      ASSESSMENT / PLAN:  ----------------------------------------  Anticipate discharge in 24-48 hours TIFFANY HERCULES  ----------------------------------------  The patient was seen at bedside. Patient with COVID-19 pneumonia. Offers no complaints.    Vital Signs Last 24 Hrs  T(C): 36.3 (04 Jan 2022 08:00), Max: 36.3 (04 Jan 2022 08:00)  T(F): 97.3 (04 Jan 2022 08:00), Max: 97.3 (04 Jan 2022 08:00)  HR: 84 (04 Jan 2022 08:00) (84 - 97)  BP: 135/85 (04 Jan 2022 08:00) (122/83 - 135/85)  BP(mean): 108 (03 Jan 2022 16:08) (108 - 108)  RR: 20 (04 Jan 2022 08:00) (18 - 23)  SpO2: 93% (04 Jan 2022 08:00) (92% - 95%)    PHYSICAL EXAMINATION:  ----------------------------------------  General appearance: No acute distress, Awake, Alert  HEENT: Normocephalic, Atraumatic, Conjunctiva clear, EOMI  Neck: Supple, No JVD, No tenderness  Lungs: Breath sound equal bilaterally, No wheezes, No rales  Cardiovascular: S1S2, Regular rhythm  Abdomen: Soft, Nontender, Nondistended, No guarding/rebound, Positive bowel sounds  Extremities: No clubbing, No cyanosis, No edema, No calf tenderness. Right ankle/foot dressing in place  Neuro: Strength equal bilaterally, No tremors  Psychiatric: Appropriate mood, Normal affect    LABORATORY STUDIES:  ----------------------------------------  01-04    x   |  x   |  x   ----------------------------<  x   x    |  x   |  0.38<L>      TPro  6.2<L>  /  Alb  3.5  /  TBili  0.3<L>  /  DBili  0.1  /  AST  13  /  ALT  21  /  AlkPhos  62  01-04    LIVER FUNCTIONS - ( 04 Jan 2022 07:58 )  Alb: 3.5 g/dL / Pro: 6.2 g/dL / ALK PHOS: 62 U/L / ALT: 21 U/L / AST: 13 U/L / GGT: x           PT/INR - ( 04 Jan 2022 07:58 )   PT: 12.3 sec;   INR: 1.06 ratio      MEDICATIONS  (STANDING):  chlorhexidine 4% Liquid 1 Application(s) Topical <User Schedule>  cloZAPine 200 milliGRAM(s) Oral at bedtime  cloZAPine 25 milliGRAM(s) Oral at bedtime  collagenase Ointment 1 Application(s) Topical daily  dexAMETHasone  IVPB 10 milliGRAM(s) IV Intermittent daily  enoxaparin Injectable 40 milliGRAM(s) SubCutaneous two times a day  guaiFENesin  milliGRAM(s) Oral every 12 hours  losartan 100 milliGRAM(s) Oral at bedtime  OLANZapine Injectable 5 milliGRAM(s) IntraMuscular every 6 hours  valproic acid 1000 milliGRAM(s) Oral at bedtime    MEDICATIONS  (PRN):  ALBUTerol    90 MICROgram(s) HFA Inhaler 1 Puff(s) Inhalation every 4 hours PRN SOB  haloperidol    Injectable 5 milliGRAM(s) IV Push every 6 hours PRN restelssness and agitation  melatonin 3 milliGRAM(s) Oral at bedtime PRN Sleep  morphine  - Injectable 2 milliGRAM(s) IV Push every 4 hours PRN tachypnea/anxiousness      ASSESSMENT / PLAN:  ----------------------------------------  57 y/o male with PMH of schizophrenia, DM, obesity, HTN who was sent from [facility] on the [place] for worsening R foot wound and cellulitis; he was initially admitted to medical floor; treated with Doxycycline and Unasyn, ID consulted. Patient's hospital course was complicated with altered mental status, hyperactive delirium and hypoxia. He was placed on Precedex infusion for agitation and NIPPV for hypoxia. Antibiotics  D/Basim on 12/30 per ID. Covid positive, he received 1 dose of Actemra, on Decadron and Remdesivir. Patient now off Precedex, transitioned to HF; downgraded to medical floor for further management.    Acute hypoxic respiratory failure / COVID-19 pneumonia - Completed the course of remdesivir. On dexamethasone. Overall respiratory status improved. On supplemental oxygen via nasal canula. Contiue to titrate as tolerated. D-dimer was not elevated. On enoxaparin for venous thromboembolism prophylaxis.     Pneumonia - The patient was previously treated with a course of antibiotics. Leukocytosis resolved and afebrile.    Schizophrenia - On clozapine, olanzapine, and valproic acid.    Ankle wound - Previously treated with doxycycline. Continue with local wound care. Infectious Disease follow up noted.    Diabetes - Insulin coverage, close monitoring of blood glucose levels.    Hypertension - Close blood pressure monitoring. On losartan.

## 2022-01-04 NOTE — PROCEDURE NOTE - NSICDXPROCEDURE_GEN_ALL_CORE_FT
PROCEDURES:  Insertion, needle, vein 04-Jan-2022 13:20:25  Thelma Ramsey  Placement, needle, with ultrasound guidance 04-Jan-2022 13:20:58  Thelma Ramsey

## 2022-01-04 NOTE — PHYSICAL THERAPY INITIAL EVALUATION ADULT - PERTINENT HX OF CURRENT PROBLEM, REHAB EVAL
55 y/o male with PMH of schizophrenia, DM, obesity, HTN who was sent from [facility] on the [place] for worsening R foot wound and cellulitis; he was initially admitted to medical floor; treated with Doxycycline and Unasyn, ID consulted. Patient's hospital course was complicated with altered mental status, hyperactive delirium and hypoxia.

## 2022-01-05 ENCOUNTER — TRANSCRIPTION ENCOUNTER (OUTPATIENT)
Age: 57
End: 2022-01-05

## 2022-01-05 LAB
ALBUMIN SERPL ELPH-MCNC: 3.9 G/DL — SIGNIFICANT CHANGE UP (ref 3.3–5.2)
ALP SERPL-CCNC: 82 U/L — SIGNIFICANT CHANGE UP (ref 40–120)
ALT FLD-CCNC: 24 U/L — SIGNIFICANT CHANGE UP
AST SERPL-CCNC: 12 U/L — SIGNIFICANT CHANGE UP
BILIRUB DIRECT SERPL-MCNC: 0.1 MG/DL — SIGNIFICANT CHANGE UP (ref 0–0.3)
BILIRUB INDIRECT FLD-MCNC: 0.3 MG/DL — SIGNIFICANT CHANGE UP (ref 0.2–1)
BILIRUB SERPL-MCNC: 0.4 MG/DL — SIGNIFICANT CHANGE UP (ref 0.4–2)
CREAT SERPL-MCNC: 0.45 MG/DL — LOW (ref 0.5–1.3)
GLUCOSE BLDC GLUCOMTR-MCNC: 354 MG/DL — HIGH (ref 70–99)
INR BLD: 0.98 RATIO — SIGNIFICANT CHANGE UP (ref 0.88–1.16)
PROT SERPL-MCNC: 7 G/DL — SIGNIFICANT CHANGE UP (ref 6.6–8.7)
PROTHROM AB SERPL-ACNC: 11.4 SEC — SIGNIFICANT CHANGE UP (ref 10.6–13.6)
SARS-COV-2 RNA SPEC QL NAA+PROBE: SIGNIFICANT CHANGE UP

## 2022-01-05 PROCEDURE — 99232 SBSQ HOSP IP/OBS MODERATE 35: CPT

## 2022-01-05 RX ORDER — COLLAGENASE CLOSTRIDIUM HIST. 250 UNIT/G
1 OINTMENT (GRAM) TOPICAL
Qty: 30 | Refills: 0
Start: 2022-01-05

## 2022-01-05 RX ORDER — OLANZAPINE 15 MG/1
10 TABLET, FILM COATED ORAL DAILY
Refills: 0 | Status: DISCONTINUED | OUTPATIENT
Start: 2022-01-05 | End: 2022-01-06

## 2022-01-05 RX ORDER — ASPIRIN/CALCIUM CARB/MAGNESIUM 324 MG
1 TABLET ORAL
Qty: 0 | Refills: 0 | DISCHARGE

## 2022-01-05 RX ORDER — INSULIN LISPRO 100/ML
2 VIAL (ML) SUBCUTANEOUS ONCE
Refills: 0 | Status: COMPLETED | OUTPATIENT
Start: 2022-01-05 | End: 2022-01-05

## 2022-01-05 RX ADMIN — Medication 1000 MILLIGRAM(S): at 22:30

## 2022-01-05 RX ADMIN — Medication 102 MILLIGRAM(S): at 05:24

## 2022-01-05 RX ADMIN — CLOZAPINE 200 MILLIGRAM(S): 150 TABLET, ORALLY DISINTEGRATING ORAL at 22:29

## 2022-01-05 RX ADMIN — OLANZAPINE 10 MILLIGRAM(S): 15 TABLET, FILM COATED ORAL at 13:45

## 2022-01-05 RX ADMIN — ENOXAPARIN SODIUM 40 MILLIGRAM(S): 100 INJECTION SUBCUTANEOUS at 05:24

## 2022-01-05 RX ADMIN — Medication 1 APPLICATION(S): at 13:47

## 2022-01-05 RX ADMIN — Medication 3 MILLIGRAM(S): at 00:18

## 2022-01-05 RX ADMIN — CHLORHEXIDINE GLUCONATE 1 APPLICATION(S): 213 SOLUTION TOPICAL at 09:08

## 2022-01-05 RX ADMIN — ENOXAPARIN SODIUM 40 MILLIGRAM(S): 100 INJECTION SUBCUTANEOUS at 17:02

## 2022-01-05 RX ADMIN — Medication 600 MILLIGRAM(S): at 05:23

## 2022-01-05 RX ADMIN — Medication 2 UNIT(S): at 22:29

## 2022-01-05 RX ADMIN — Medication 600 MILLIGRAM(S): at 17:02

## 2022-01-05 RX ADMIN — HALOPERIDOL DECANOATE 5 MILLIGRAM(S): 100 INJECTION INTRAMUSCULAR at 05:30

## 2022-01-05 RX ADMIN — OLANZAPINE 5 MILLIGRAM(S): 15 TABLET, FILM COATED ORAL at 10:09

## 2022-01-05 NOTE — DISCHARGE NOTE NURSING/CASE MANAGEMENT/SOCIAL WORK - NSDCPEFALRISK_GEN_ALL_CORE
For information on Fall & Injury Prevention, visit: [email or web address] OR  [email or web address] OR  https://www.cdc.gov/steadi/patient.html

## 2022-01-05 NOTE — DISCHARGE NOTE PROVIDER - HOSPITAL COURSE
56M referred to the hospital from for a right foot wound. On presentation, Temp(100.2), WBC(17.07). Chest Xray noted bilateral infiltrates. Respiratory viral panel was negative. CT of the head noted no acute intracranial hemorrhage, mass effect, midline shift, or acute territorial infarct, noted mild generalized cerebral volume loss, chronic microvascular ischemic disease. CT angiogram of the chest noted emphysema and bilateral upper lobe cyst bronchiectasis, pleural-based 3.9 x 2.8cm nodular opacity in the right upper lobe, hazy ground glass opacities in the left upper lobe, tree-in-bud ground glass nodularity in the left lower lobe. Empiric antibiotics were initiated for pneumonia. The patient did not allow for intravenous antibiotics. Doxycycline was also initiated for the ankle wound. The patient was seen by Infectious Disease in consultation and the antibiotics were adjusted. The patient was note to have increased hypoxia. He was noted to have agitation with aggression and required constant bedside observation. Blood cultures were without growth. Repeat chest Xray noted residual right upper lobe, peripheral and left perihilar multifocal and diffuse ill-defined airspace opacities. The patient was seen by ICU for continued agitation and increased hypoxia. He was transferred to he intensive care unit for noninvasive ventilation and required further sedation. Respiratory viral panel was positive for COVID-19. Remdesivir and dexamethasone were initiated. The patient was seen by Ethics and was noted to have no surrogates for decision making. The patient was seen by Infectious Disease and tocilizumab was administered for COVID-19 pneumonia and acute hypoxic respiratory failure. The patient was transferred to the Medical service for further management. The patient was continued on supplemental oxygen via high flow nasal oxygen for hypoxia. The patient had improvement in the hypoxia and supplemental oxygen was titrated. The patient was evaluated and thought to be appropriate for outpatient physical therapy upon discharge from the hospital. Local wound care was continued for the right foot ulcers. The patient had continued improvement in his symptoms. The CT results were discussed with the patient with recommendations for further follow up and repeat imaging to follow up after acute infectious issues have resolved. The patient was discharged home with instructions to follow up with his primary care physician for further management.        38 minutes total time

## 2022-01-05 NOTE — PROGRESS NOTE ADULT - SUBJECTIVE AND OBJECTIVE BOX
TIFFANY HERCULES  ----------------------------------------  The patient was seen at bedside. Patient with COVID-19 and foot wound. Offers no complaints.    Vital Signs Last 24 Hrs  T(C): 36.6 (05 Jan 2022 08:00), Max: 37.4 (04 Jan 2022 20:00)  T(F): 97.8 (05 Jan 2022 08:00), Max: 99.3 (04 Jan 2022 20:00)  HR: 83 (05 Jan 2022 08:00) (63 - 106)  BP: 132/85 (05 Jan 2022 08:00) (118/73 - 134/83)  BP(mean): --  RR: 20 (05 Jan 2022 08:00) (20 - 21)  SpO2: 92% (05 Jan 2022 08:00) (91% - 96%)    PHYSICAL EXAMINATION:  ----------------------------------------  General appearance: No acute distress, Awake, Alert  HEENT: Normocephalic, Atraumatic, Conjunctiva clear, EOMI  Neck: Supple, No JVD, No tenderness  Lungs: Breath sound equal bilaterally, No wheezes, Mild rales  Cardiovascular: S1S2, Regular rhythm  Abdomen: Soft, Nontender, Nondistended, No guarding/rebound, Positive bowel sounds  Extremities: No clubbing, No cyanosis, No edema, No calf tenderness. Right ankle/foot dressing in place  Neuro: Strength equal bilaterally, No tremors  Psychiatric: Appropriate mood, Normal affect    LABORATORY STUDIES:  ----------------------------------------  01-05    x   |  x   |  x   ----------------------------<  x   x    |  x   |  0.45<L>      TPro  7.0  /  Alb  3.9  /  TBili  0.4  /  DBili  0.1  /  AST  12  /  ALT  24  /  AlkPhos  82  01-05    LIVER FUNCTIONS - ( 05 Jan 2022 06:41 )  Alb: 3.9 g/dL / Pro: 7.0 g/dL / ALK PHOS: 82 U/L / ALT: 24 U/L / AST: 12 U/L / GGT: x           PT/INR - ( 05 Jan 2022 06:41 )   PT: 11.4 sec;   INR: 0.98 ratio      MEDICATIONS  (STANDING):  chlorhexidine 4% Liquid 1 Application(s) Topical <User Schedule>  cloZAPine 200 milliGRAM(s) Oral at bedtime  cloZAPine 25 milliGRAM(s) Oral at bedtime  collagenase Ointment 1 Application(s) Topical daily  dexAMETHasone  IVPB 10 milliGRAM(s) IV Intermittent daily  enoxaparin Injectable 40 milliGRAM(s) SubCutaneous two times a day  guaiFENesin  milliGRAM(s) Oral every 12 hours  losartan 100 milliGRAM(s) Oral at bedtime  OLANZapine Injectable 5 milliGRAM(s) IntraMuscular every 6 hours  valproic acid 1000 milliGRAM(s) Oral at bedtime    MEDICATIONS  (PRN):  ALBUTerol    90 MICROgram(s) HFA Inhaler 1 Puff(s) Inhalation every 4 hours PRN SOB  haloperidol    Injectable 5 milliGRAM(s) IV Push every 6 hours PRN restelssness and agitation  melatonin 3 milliGRAM(s) Oral at bedtime PRN Sleep  morphine  - Injectable 2 milliGRAM(s) IV Push every 4 hours PRN tachypnea/anxiousness      ASSESSMENT / PLAN:  ----------------------------------------  57 y/o male with PMH of schizophrenia, DM, obesity, HTN who was sent from [facility] on the [place] for worsening R foot wound and cellulitis; he was initially admitted to medical floor; treated with Doxycycline and Unasyn, ID consulted. Patient's hospital course was complicated with altered mental status, hyperactive delirium and hypoxia. He was placed on Precedex infusion for agitation and NIPPV for hypoxia. Antibiotics  D/Basim on 12/30 per ID. Covid positive, he received 1 dose of Actemra, on Decadron and Remdesivir. Patient now off Precedex, transitioned to HF; downgraded to medical floor for further management.    Acute hypoxic respiratory failure / COVID-19 pneumonia - Previously completed the course of remdesivir. On dexamethasone(day#8). Overall respiratory status improved, continue to monitor off supplemental oxygen as tolerated. D-dimer was not elevated. On enoxaparin for venous thromboembolism prophylaxis.     Pneumonia - The patient was previously treated with a course of antibiotics. Leukocytosis resolved and afebrile.    Schizophrenia - On clozapine, olanzapine, and valproic acid.    Ankle wound - Previously treated with doxycycline. Continue with local wound care.    Diabetes - Insulin coverage, close monitoring of blood glucose levels.    Hypertension - Close blood pressure monitoring. On losartan.

## 2022-01-05 NOTE — DISCHARGE NOTE PROVIDER - NSDCMRMEDTOKEN_GEN_ALL_CORE_FT
Aspirin Enteric Coated 81 mg oral delayed release tablet: 1 tab(s) orally once a day  Clozaril 200 mg oral tablet: 1 tab(s) orally once a day (at bedtime)  Clozaril 25 mg oral tablet: 1 tab(s) orally once a day (at bedtime)  collagenase 250 units/g topical ointment: 1 application topically once a day  Combivent 18 mcg-103 mcg-/inh inhalation aerosol: 2 puff(s) inhaled every 4 hours  Cozaar 100 mg oral tablet: 1 tab(s) orally once a day (at bedtime)  Depakene 250 mg/5 mL oral liquid: 20 milliliter(s) orally once a day (at bedtime)  Haldol 5 mg/mL injectable solution: 100 milligram(s) injectable every 4 weeks  Levemir 100 units/mL subcutaneous solution: 30 unit(s) subcutaneous once a day (at bedtime)   Aspirin Enteric Coated 81 mg oral delayed release tablet: 1 tab(s) orally once a day for 30 days  Clozaril 200 mg oral tablet: 1 tab(s) orally once a day (at bedtime)  Clozaril 25 mg oral tablet: 1 tab(s) orally once a day (at bedtime)  collagenase 250 units/g topical ointment: 1 application topically once a day  Combivent 18 mcg-103 mcg-/inh inhalation aerosol: 2 puff(s) inhaled every 4 hours  Cozaar 100 mg oral tablet: 1 tab(s) orally once a day (at bedtime)  Depakene 250 mg/5 mL oral liquid: 20 milliliter(s) orally once a day (at bedtime)  Haldol 5 mg/mL injectable solution: 100 milligram(s) injectable every 4 weeks  Levemir 100 units/mL subcutaneous solution: 30 unit(s) subcutaneous once a day (at bedtime)   Aspirin Enteric Coated 81 mg oral delayed release tablet: 1 tab(s) orally once a day for 30 days  Clozaril 200 mg oral tablet: 1 tab(s) orally once a day (at bedtime)  Clozaril 25 mg oral tablet: 1 tab(s) orally once a day (at bedtime)  collagenase 250 units/g topical ointment: Apply topically to affected area 3 times a week   Combivent 18 mcg-103 mcg-/inh inhalation aerosol: 2 puff(s) inhaled every 4 hours  Cozaar 100 mg oral tablet: 1 tab(s) orally once a day (at bedtime)  Depakene 250 mg/5 mL oral liquid: 20 milliliter(s) orally once a day (at bedtime)  Haldol 5 mg/mL injectable solution: 100 milligram(s) injectable every 4 weeks  Levemir 100 units/mL subcutaneous solution: 30 unit(s) subcutaneous once a day (at bedtime)

## 2022-01-05 NOTE — PROGRESS NOTE ADULT - SUBJECTIVE AND OBJECTIVE BOX
TIFFANY HERCULES  ----------------------------------------  The patient was seen at bedside. Patient with COVID-19 pneumonia. Offers no complaints. Denied dyspnea or chest pain.    Vital Signs Last 24 Hrs  T(C): 36.6 (05 Jan 2022 08:00), Max: 37.4 (04 Jan 2022 20:00)  T(F): 97.8 (05 Jan 2022 08:00), Max: 99.3 (04 Jan 2022 20:00)  HR: 83 (05 Jan 2022 08:00) (63 - 106)  BP: 132/85 (05 Jan 2022 08:00) (118/73 - 134/83)  BP(mean): --  RR: 20 (05 Jan 2022 08:00) (20 - 21)  SpO2: 92% (05 Jan 2022 08:00) (91% - 96%)    PHYSICAL EXAMINATION:  ----------------------------------------  General appearance: No acute distress, Awake, Alert  HEENT: Normocephalic, Atraumatic, Conjunctiva clear, EOMI  Neck: Supple, No JVD, No tenderness  Lungs: Breath sound equal bilaterally, No wheezes, No rales  Cardiovascular: S1S2, Regular rhythm  Abdomen: Soft, Nontender, Nondistended, No guarding/rebound, Positive bowel sounds  Extremities: No clubbing, No cyanosis, No edema, No calf tenderness. Right ankle/foot dressing clean and intact  Neuro: Strength equal bilaterally, No tremors  Psychiatric: Appropriate mood, Normal affect    LABORATORY STUDIES:  ----------------------------------------  01-05    x   |  x   |  x   ----------------------------<  x   x    |  x   |  0.45<L>      TPro  7.0  /  Alb  3.9  /  TBili  0.4  /  DBili  0.1  /  AST  12  /  ALT  24  /  AlkPhos  82  01-05    LIVER FUNCTIONS - ( 05 Jan 2022 06:41 )  Alb: 3.9 g/dL / Pro: 7.0 g/dL / ALK PHOS: 82 U/L / ALT: 24 U/L / AST: 12 U/L / GGT: x           PT/INR - ( 05 Jan 2022 06:41 )   PT: 11.4 sec;   INR: 0.98 ratio      MEDICATIONS  (STANDING):  chlorhexidine 4% Liquid 1 Application(s) Topical <User Schedule>  cloZAPine 200 milliGRAM(s) Oral at bedtime  cloZAPine 25 milliGRAM(s) Oral at bedtime  collagenase Ointment 1 Application(s) Topical daily  dexAMETHasone  IVPB 10 milliGRAM(s) IV Intermittent daily  enoxaparin Injectable 40 milliGRAM(s) SubCutaneous two times a day  guaiFENesin  milliGRAM(s) Oral every 12 hours  losartan 100 milliGRAM(s) Oral at bedtime  OLANZapine 10 milliGRAM(s) Oral daily  valproic acid 1000 milliGRAM(s) Oral at bedtime    MEDICATIONS  (PRN):  ALBUTerol    90 MICROgram(s) HFA Inhaler 1 Puff(s) Inhalation every 4 hours PRN SOB  haloperidol    Injectable 5 milliGRAM(s) IV Push every 6 hours PRN restelssness and agitation  melatonin 3 milliGRAM(s) Oral at bedtime PRN Sleep  morphine  - Injectable 2 milliGRAM(s) IV Push every 4 hours PRN tachypnea/anxiousness      ASSESSMENT / PLAN:  ----------------------------------------  57 y/o male with PMH of schizophrenia, DM, obesity, HTN who was sent from [facility] on the [place] for worsening R foot wound and cellulitis; he was initially admitted to medical floor; treated with Doxycycline and Unasyn, ID consulted. Patient's hospital course was complicated with altered mental status, hyperactive delirium and hypoxia. He was placed on Precedex infusion for agitation and NIPPV for hypoxia. Antibiotics  D/Basim on 12/30 per ID. Covid positive, he received 1 dose of Actemra, on Decadron and Remdesivir. Patient now off Precedex, transitioned to HF; downgraded to medical floor for further management.    Acute hypoxic respiratory failure / COVID-19 pneumonia - Previously completed the course of remdesivir. On dexamethasone(day#9). Overall respiratory status improved, no further hypoxia. D-dimer was not elevated. On enoxaparin for venous thromboembolism prophylaxis. Discussed the CT results in regards to the upper lobe nodule and need for repeat imaging after acute infectious process has resolved. The patient expressed understanding and agreement.    Pneumonia - The patient was previously treated with a course of antibiotics. Leukocytosis resolved and afebrile.    Schizophrenia - On clozapine, olanzapine, and valproic acid.    Ankle wound - Previously treated with doxycycline. Continue with local wound care.    Diabetes - Insulin coverage, close monitoring of blood glucose levels.    Hypertension - Close blood pressure monitoring. On losartan.

## 2022-01-05 NOTE — DISCHARGE NOTE PROVIDER - NSDCCPCAREPLAN_GEN_ALL_CORE_FT
PRINCIPAL DISCHARGE DIAGNOSIS  Diagnosis: 2019 novel coronavirus disease (COVID-19)  Assessment and Plan of Treatment: Continue on dexamethasone for one more day. Follow up with your primary care physician for further management.      SECONDARY DISCHARGE DIAGNOSES  Diagnosis: Schizophrenia  Assessment and Plan of Treatment: Continue on your home medications. Follow up with your psychiatrist for further treatment.    Diagnosis: Diabetes  Assessment and Plan of Treatment: Continue on your home diabetes medications. Monitor glucose levels.    Diagnosis: Wound eschar of foot  Assessment and Plan of Treatment: Continue with dressing changes and wound care.     PRINCIPAL DISCHARGE DIAGNOSIS  Diagnosis: 2019 novel coronavirus disease (COVID-19)  Assessment and Plan of Treatment: Continue on dexamethasone for one more day. Follow up with your primary care physician for further management.      SECONDARY DISCHARGE DIAGNOSES  Diagnosis: Schizophrenia  Assessment and Plan of Treatment: Continue on your home medications. Follow up with your psychiatrist for further treatment.    Diagnosis: Diabetes  Assessment and Plan of Treatment: Continue on your home diabetes medications. Monitor glucose levels.    Diagnosis: Wound eschar of foot  Assessment and Plan of Treatment: Continue with dressing changes and Santyl three times a week.

## 2022-01-05 NOTE — DISCHARGE NOTE NURSING/CASE MANAGEMENT/SOCIAL WORK - PATIENT PORTAL LINK FT
You can access the FollowMyHealth Patient Portal offered by [facility] by registering at the following website: [email or web address]. By joining [employer]’s FollowMyHealth portal, you will also be able to view your health information using other applications (apps) compatible with our system.

## 2022-01-06 VITALS
TEMPERATURE: 97 F | HEART RATE: 88 BPM | OXYGEN SATURATION: 97 % | SYSTOLIC BLOOD PRESSURE: 122 MMHG | RESPIRATION RATE: 19 BRPM | DIASTOLIC BLOOD PRESSURE: 85 MMHG

## 2022-01-06 LAB
ALBUMIN SERPL ELPH-MCNC: 3.9 G/DL — SIGNIFICANT CHANGE UP (ref 3.3–5.2)
ALP SERPL-CCNC: 66 U/L — SIGNIFICANT CHANGE UP (ref 40–120)
ALT FLD-CCNC: 20 U/L — SIGNIFICANT CHANGE UP
AST SERPL-CCNC: 13 U/L — SIGNIFICANT CHANGE UP
BILIRUB DIRECT SERPL-MCNC: 0.1 MG/DL — SIGNIFICANT CHANGE UP (ref 0–0.3)
BILIRUB INDIRECT FLD-MCNC: 0.4 MG/DL — SIGNIFICANT CHANGE UP (ref 0.2–1)
BILIRUB SERPL-MCNC: 0.5 MG/DL — SIGNIFICANT CHANGE UP (ref 0.4–2)
CREAT SERPL-MCNC: 0.36 MG/DL — LOW (ref 0.5–1.3)
INR BLD: 1.01 RATIO — SIGNIFICANT CHANGE UP (ref 0.88–1.16)
PROT SERPL-MCNC: 6.6 G/DL — SIGNIFICANT CHANGE UP (ref 6.6–8.7)
PROTHROM AB SERPL-ACNC: 11.7 SEC — SIGNIFICANT CHANGE UP (ref 10.6–13.6)

## 2022-01-06 PROCEDURE — 82962 GLUCOSE BLOOD TEST: CPT

## 2022-01-06 PROCEDURE — 82947 ASSAY GLUCOSE BLOOD QUANT: CPT

## 2022-01-06 PROCEDURE — U0003: CPT

## 2022-01-06 PROCEDURE — 99239 HOSP IP/OBS DSCHRG MGMT >30: CPT

## 2022-01-06 PROCEDURE — 82330 ASSAY OF CALCIUM: CPT

## 2022-01-06 PROCEDURE — 84484 ASSAY OF TROPONIN QUANT: CPT

## 2022-01-06 PROCEDURE — 85379 FIBRIN DEGRADATION QUANT: CPT

## 2022-01-06 PROCEDURE — 82803 BLOOD GASES ANY COMBINATION: CPT

## 2022-01-06 PROCEDURE — 84100 ASSAY OF PHOSPHORUS: CPT

## 2022-01-06 PROCEDURE — 80053 COMPREHEN METABOLIC PANEL: CPT

## 2022-01-06 PROCEDURE — 86803 HEPATITIS C AB TEST: CPT

## 2022-01-06 PROCEDURE — 82565 ASSAY OF CREATININE: CPT

## 2022-01-06 PROCEDURE — 96374 THER/PROPH/DIAG INJ IV PUSH: CPT

## 2022-01-06 PROCEDURE — 80164 ASSAY DIPROPYLACETIC ACD TOT: CPT

## 2022-01-06 PROCEDURE — 70450 CT HEAD/BRAIN W/O DYE: CPT | Mod: MA

## 2022-01-06 PROCEDURE — 85014 HEMATOCRIT: CPT

## 2022-01-06 PROCEDURE — 83880 ASSAY OF NATRIURETIC PEPTIDE: CPT

## 2022-01-06 PROCEDURE — 84295 ASSAY OF SERUM SODIUM: CPT

## 2022-01-06 PROCEDURE — 85018 HEMOGLOBIN: CPT

## 2022-01-06 PROCEDURE — 83605 ASSAY OF LACTIC ACID: CPT

## 2022-01-06 PROCEDURE — 87040 BLOOD CULTURE FOR BACTERIA: CPT

## 2022-01-06 PROCEDURE — 87086 URINE CULTURE/COLONY COUNT: CPT

## 2022-01-06 PROCEDURE — 85610 PROTHROMBIN TIME: CPT

## 2022-01-06 PROCEDURE — 85027 COMPLETE CBC AUTOMATED: CPT

## 2022-01-06 PROCEDURE — 84132 ASSAY OF SERUM POTASSIUM: CPT

## 2022-01-06 PROCEDURE — 71045 X-RAY EXAM CHEST 1 VIEW: CPT

## 2022-01-06 PROCEDURE — 81003 URINALYSIS AUTO W/O SCOPE: CPT

## 2022-01-06 PROCEDURE — 85025 COMPLETE CBC W/AUTO DIFF WBC: CPT

## 2022-01-06 PROCEDURE — 94660 CPAP INITIATION&MGMT: CPT

## 2022-01-06 PROCEDURE — U0005: CPT

## 2022-01-06 PROCEDURE — 83036 HEMOGLOBIN GLYCOSYLATED A1C: CPT

## 2022-01-06 PROCEDURE — 96372 THER/PROPH/DIAG INJ SC/IM: CPT

## 2022-01-06 PROCEDURE — 97163 PT EVAL HIGH COMPLEX 45 MIN: CPT

## 2022-01-06 PROCEDURE — 94760 N-INVAS EAR/PLS OXIMETRY 1: CPT

## 2022-01-06 PROCEDURE — 85730 THROMBOPLASTIN TIME PARTIAL: CPT

## 2022-01-06 PROCEDURE — 80076 HEPATIC FUNCTION PANEL: CPT

## 2022-01-06 PROCEDURE — 86703 HIV-1/HIV-2 1 RESULT ANTBDY: CPT

## 2022-01-06 PROCEDURE — 99285 EMERGENCY DEPT VISIT HI MDM: CPT | Mod: 25

## 2022-01-06 PROCEDURE — 82435 ASSAY OF BLOOD CHLORIDE: CPT

## 2022-01-06 PROCEDURE — 36415 COLL VENOUS BLD VENIPUNCTURE: CPT

## 2022-01-06 PROCEDURE — 0225U NFCT DS DNA&RNA 21 SARSCOV2: CPT

## 2022-01-06 PROCEDURE — 71275 CT ANGIOGRAPHY CHEST: CPT | Mod: MA

## 2022-01-06 PROCEDURE — 83735 ASSAY OF MAGNESIUM: CPT

## 2022-01-06 PROCEDURE — 96375 TX/PRO/DX INJ NEW DRUG ADDON: CPT

## 2022-01-06 PROCEDURE — 94640 AIRWAY INHALATION TREATMENT: CPT

## 2022-01-06 PROCEDURE — 93005 ELECTROCARDIOGRAM TRACING: CPT

## 2022-01-06 PROCEDURE — 36600 WITHDRAWAL OF ARTERIAL BLOOD: CPT

## 2022-01-06 PROCEDURE — 80048 BASIC METABOLIC PNL TOTAL CA: CPT

## 2022-01-06 RX ADMIN — ENOXAPARIN SODIUM 40 MILLIGRAM(S): 100 INJECTION SUBCUTANEOUS at 06:17

## 2022-01-06 RX ADMIN — Medication 600 MILLIGRAM(S): at 06:18

## 2022-01-06 RX ADMIN — CHLORHEXIDINE GLUCONATE 1 APPLICATION(S): 213 SOLUTION TOPICAL at 05:53

## 2022-01-06 RX ADMIN — OLANZAPINE 10 MILLIGRAM(S): 15 TABLET, FILM COATED ORAL at 12:44

## 2022-01-06 NOTE — PROGRESS NOTE ADULT - SUBJECTIVE AND OBJECTIVE BOX
TIFFANY HERCULES  ----------------------------------------  The patient was seen at bedside. Patient with COVID-19 pneumonia. Offers no complaints. Reports feeling well.    Vital Signs Last 24 Hrs  T(C): 36.3 (06 Jan 2022 09:15), Max: 37.2 (05 Jan 2022 15:00)  T(F): 97.4 (06 Jan 2022 09:15), Max: 98.9 (05 Jan 2022 15:00)  HR: 88 (06 Jan 2022 09:15) (88 - 102)  BP: 122/85 (06 Jan 2022 09:15) (114/72 - 142/79)  BP(mean): --  RR: 19 (06 Jan 2022 09:15) (19 - 20)  SpO2: 97% (06 Jan 2022 09:15) (90% - 97%)    CAPILLARY BLOOD GLUCOSE  POCT Blood Glucose.: 354 mg/dL (05 Jan 2022 20:51)    PHYSICAL EXAMINATION:  ----------------------------------------  General appearance: No acute distress, Awake, Alert  HEENT: Normocephalic, Atraumatic, Conjunctiva clear, EOMI  Neck: Supple, No JVD, No tenderness  Lungs: Breath sound equal bilaterally, No wheezes, No rales  Cardiovascular: S1S2, Regular rhythm  Abdomen: Soft, Nontender, Nondistended, No guarding/rebound, Positive bowel sounds  Extremities: No clubbing, No cyanosis, No edema, No calf tenderness  Neuro: Strength equal bilaterally, No tremors  Psychiatric: Appropriate mood, Normal affect    LABORATORY STUDIES:  ----------------------------------------  01-06    x   |  x   |  x   ----------------------------<  x   x    |  x   |  0.36<L>      TPro  6.6  /  Alb  3.9  /  TBili  0.5  /  DBili  0.1  /  AST  13  /  ALT  20  /  AlkPhos  66  01-06    LIVER FUNCTIONS - ( 06 Jan 2022 08:53 )  Alb: 3.9 g/dL / Pro: 6.6 g/dL / ALK PHOS: 66 U/L / ALT: 20 U/L / AST: 13 U/L / GGT: x           PT/INR - ( 06 Jan 2022 08:53 )   PT: 11.7 sec;   INR: 1.01 ratio      MEDICATIONS  (STANDING):  chlorhexidine 4% Liquid 1 Application(s) Topical <User Schedule>  cloZAPine 200 milliGRAM(s) Oral at bedtime  cloZAPine 25 milliGRAM(s) Oral at bedtime  collagenase Ointment 1 Application(s) Topical daily  dexAMETHasone  IVPB 10 milliGRAM(s) IV Intermittent daily  enoxaparin Injectable 40 milliGRAM(s) SubCutaneous two times a day  guaiFENesin  milliGRAM(s) Oral every 12 hours  losartan 100 milliGRAM(s) Oral at bedtime  OLANZapine 10 milliGRAM(s) Oral daily  valproic acid 1000 milliGRAM(s) Oral at bedtime    MEDICATIONS  (PRN):  ALBUTerol    90 MICROgram(s) HFA Inhaler 1 Puff(s) Inhalation every 4 hours PRN SOB  haloperidol    Injectable 5 milliGRAM(s) IV Push every 6 hours PRN restelssness and agitation  melatonin 3 milliGRAM(s) Oral at bedtime PRN Sleep      ASSESSMENT / PLAN:  ----------------------------------------  57 y/o male with PMH of schizophrenia, DM, obesity, HTN who was sent from [facility] on the [place] for worsening R foot wound and cellulitis; he was initially admitted to medical floor; treated with Doxycycline and Unasyn, ID consulted. Patient's hospital course was complicated with altered mental status, hyperactive delirium and hypoxia. He was placed on Precedex infusion for agitation and NIPPV for hypoxia. Antibiotics  D/Basim on 12/30 per ID. Covid positive, he received 1 dose of Actemra, on Decadron and Remdesivir. Patient now off Precedex, transitioned to HF; downgraded to medical floor for further management.    Acute hypoxic respiratory failure / COVID-19 pneumonia - Previously completed the course of remdesivir. Overall respiratory status improved, no further hypoxia. Discussed the CT results in regards to the upper lobe nodule and need for repeat imaging after acute infectious process has resolved. The patient expressed understanding and agreement.    Pneumonia - The patient was previously treated with a course of antibiotics. Leukocytosis resolved and afebrile.    Schizophrenia - On clozapine, olanzapine, and valproic acid.    Ankle wound - Previously treated with doxycycline. Continue with local wound care.    Diabetes - Insulin coverage, close monitoring of blood glucose levels.    Hypertension - Close blood pressure monitoring. On losartan.

## 2022-01-06 NOTE — PROGRESS NOTE ADULT - REASON FOR ADMISSION
foot wound
foot wound
foot wound - Now covid +
foot wound

## 2022-01-06 NOTE — CHART NOTE - NSCHARTNOTEFT_GEN_A_CORE
Source: Patient [x ]  Family [ ]   other [ x]    Current Diet: Diet, Regular (12-30-21 @ 23:54)    Patient reports [ ] nausea  [ ] vomiting [ ] diarrhea [ ] constipation  [ ]chewing problems [ ] swallowing issues  [ ] other:     PO intake:  < 50% [ ]   50-75%  [ ]   %  [ x]  other :    Current Weight:   (1/4) 268.9 lbs  (12/31) 262.5 lbs  (12/29) 268 lbs  Noted with 1+ b/l LE edema, continue to trend    Pertinent Medications: MEDICATIONS  (STANDING):  chlorhexidine 4% Liquid 1 Application(s) Topical <User Schedule>  cloZAPine 200 milliGRAM(s) Oral at bedtime  cloZAPine 25 milliGRAM(s) Oral at bedtime  collagenase Ointment 1 Application(s) Topical daily  dexAMETHasone  IVPB 10 milliGRAM(s) IV Intermittent daily  enoxaparin Injectable 40 milliGRAM(s) SubCutaneous two times a day  guaiFENesin  milliGRAM(s) Oral every 12 hours  losartan 100 milliGRAM(s) Oral at bedtime  OLANZapine 10 milliGRAM(s) Oral daily  valproic acid 1000 milliGRAM(s) Oral at bedtime    MEDICATIONS  (PRN):  ALBUTerol    90 MICROgram(s) HFA Inhaler 1 Puff(s) Inhalation every 4 hours PRN SOB  haloperidol    Injectable 5 milliGRAM(s) IV Push every 6 hours PRN restelssness and agitation  melatonin 3 milliGRAM(s) Oral at bedtime PRN Sleep    Pertinent Labs: 01-06 Nan/a   Glu n/a   K+ n/a   Cr  0.36 mg/dL<L> BUN n/a   Phos n/a   Alb 3.9 g/dL PAB n/a       Skin: RLE wound per documentation    Nutrition focused physical exam conducted - found signs of malnutrition [x ]absent [ ]present    Subcutaneous fat loss: [ ] Orbital fat pads region, [ ]Buccal fat region, [ ]Triceps region,  [ ]Ribs region    Muscle wasting: [ ]Temples region, [ ]Clavicle region, [ ]Shoulder region, [ ]Scapula region, [ ]Interosseous region,  [ ]thigh region, [ ]Calf region    Estimated Needs:   [ x] no change since previous assessment  [ ] recalculated:     Current Nutrition Diagnosis: Pt remains at nutrition risk secondary to increased nutrient needs related to increased physiological demand for nutrient as evidenced by R foot wound/cellulitis and acute hypoxic respiratory failure due to COVID PNA. Pt now off BiPAP. 1:1 present at bedside. Currently on a regular diet; tolerating well with good po intake. RD to follow up.     Recommendations:   1) Continue diet as tolerated.  2) Rx: MVI, vitamin C, and vitamin D supplementation.  3) Encourage po intake, monitor diet tolerance, and provide assistance at meals as needed.  4) Obtain daily weights to monitor trends.     Monitoring and Evaluation:   [ x] PO intake [x ] Tolerance to diet prescription [X] Weights  [X] Follow up per protocol [X] Labs

## 2022-01-06 NOTE — PROGRESS NOTE ADULT - PROVIDER SPECIALTY LIST ADULT
Hospitalist
Psychiatry
Hospitalist
Critical Care
Hospitalist
Infectious Disease
Infectious Disease
Hospitalist
Infectious Disease
Psychiatry

## 2022-01-06 NOTE — CHART NOTE - NSCHARTNOTESELECT_GEN_ALL_CORE
Event Note
ETHICS/Event Note
ETHICS/Event Note
Event Note
Nutrition Services
Transfer Note

## 2022-01-12 NOTE — CDI QUERY NOTE - NSCDIOTHERTXTBX_GEN_ALL_CORE_HH
HPI:  56 year old male, admitted on 12/25/2021 with "...right foot wound..." per the H&P.  The H&P also documents "Acute hypoxic respiratory failure requiring 6 L NC...".  Rapid Covid testing on 12/24/2021 was negative.  On 12/29/2021, Rapid RVP detected SARS-CoV-2.  The Consult Note Adult-Critical Care Physician Assistant documents "Pt later found to have + COVID".    Please clarify the POA status of Covid.    A)  Covid-19, evolving on admission  B)  Covid-19, present on admission  C)  Covid-19, present after admission  D)  Other, please specify  E)  Not clinically significant        Supporting Documentation:       H&P Adult [Charted Location: [facility] 1606 08] [Authored: 25-Dec-2021 11:59]- for Visit: [id number], Complete, Revised, Signed in Full, General    History and Physical:   Source of Information	Chart(s), Patient  Outpatient Providers	Navin Peterson- NOAH Oneil- Endocrine     History of Present Illness:  Reason for Admission: foot wound  History of Present Illness:   56 yr old male from [facility] on the [facility] [place] with medical history of Schizo, DM, Obesity, HTN was sent in for right foot wound, which he states was noted yesterday. He does not recall trauma. However in ED, the wound appears > 1 day old and incidentally noted pulm infiltrates and nodule on imaging. Denies fevers, dyspnea, cough. States his wheezing/ congestion is regular and he takes inhalers.     Laboratory:   Virology:    24-Dec-2021 22:35, Respiratory Viral Panel with COVID-19 by AMY  Rapid RVP Result: NotDetec, [NotDetec]    Assessment and Plan:    Assessment:  · Assessment	  56 yr old male from [facility] on the [place] [facility] with medical history of Schizo, DM, Obesity, HTN was sent in for right foot wound, with incidental finding of PNA    # GGOs, tree in the bud opacities on imaging, leucocytosis= CAP  possible post obstructive  Blood c/s sent from ED  Rocephin, Zithromax  If no improvement will escalate  Cont maintenance Combivent inhaler  Mucinex  COVID negative, vaccinated    # Acute hypoxic respiratory failure  requiring 6 L NC  unable to tolerate NRB  etio- sec to above    # 3.9 x 2.8 cm nodular right upper lobe opacity on imaging= highly suspicious for malignancy  Need further details about chronicity, acceptance of medical intervention  SW consult requested to get info from his facility about NOK, HCP etc     # Hyponatremia  likely chronic sec to psych meds  no dietary salt restrictions  if persists or worsens will add fluid restriction    # Hypokalemia  replete    # UA with ketones= dehydration  24 hr IVF    # Schizophrenia  requiring repeated redirection/ reorientation  keeps wandering pulling off O2  Cont Valproate/ Clozaril    # DM2 uncontrolled  high HgbA1c  Lantus, premeal, ISS here    # HTN controlled  Cont Cozaar    # Right foot wound  with healing scab beginning to form  wound care ordered    Lovenox    Electronic Signatures:  Flory Corrales)  (Signed 25-Dec-2021 12:39)  	Authored: History and Physical, History of Present Illness, Allergies/Medications, Patient History, Risk Assessment, Physical Exam, Labs and Results, Assessment and Plan    Last Updated: 25-Dec-2021 12:39 by Flory Corrales)          SARS-CoV-2: Detected: This Respiratory Panel uses polymerase chain reaction (PCR) to detect for   adenovirus; coronavirus (HKU1, NL63, 229E, OC43); human metapneumovirus   (hMPV); human enterovirus/rhinovirus (Entero/RV); influenza A; influenza   A/H1; influenza A/H3; influenza A/H1-2009; influenza B; parainfluenza   viruses 1, 2, 3, 4; respiratory syncytial virus; Mycoplasma pneumoniae;   Chlamydophila pneumoniae; and SARS-CoV-2. (12.29.21 @ 09:31)           Consult Note Adult-Critical Care Physician Assistant [Charted Location: [address] 3202 02] [Authored: 29-Dec-2021 04:33]  Pt later found to have + COVID             Discharge Note Provider [Charted Location: [address] 3202 02] [Authored: 05-Jan-2022 12:49]- for Visit: [id number], Complete, Revised, Signed in Full, General     Hospital Course:  Discharge Date	06-Jan-2022  	  Admission Date	25-Dec-2021 07:58  Reason for Admission	foot wound  Hospital Course	  56M referred to the hospital from for a right foot wound. On presentation, Temp(100.2), WBC(17.07). Chest Xray noted bilateral infiltrates. Respiratory viral panel was negative. CT of the head noted no acute intracranial hemorrhage, mass effect, midline shift, or acute territorial infarct, noted mild generalized cerebral volume loss, chronic microvascular ischemic disease. CT angiogram of the chest noted emphysema and bilateral upper lobe cyst bronchiectasis, pleural-based 3.9 x 2.8cm nodular opacity in the right upper lobe, hazy ground glass opacities in the left upper lobe, tree-in-bud ground glass nodularity in the left lower lobe. Empiric antibiotics were initiated for pneumonia. The patient did not allow for intravenous antibiotics. Doxycycline was also initiated for the ankle wound. The patient was seen by Infectious Disease in consultation and the antibiotics were adjusted. The patient was note to have increased hypoxia. He was noted to have agitation with aggression and required constant bedside observation. Blood cultures were without growth. Repeat chest Xray noted residual right upper lobe, peripheral and left perihilar multifocal and diffuse ill-defined airspace opacities. The patient was seen by ICU for continued agitation and increased hypoxia. He was transferred to he intensive care unit for noninvasive ventilation and required further sedation. Respiratory viral panel was positive for COVID-19. Remdesivir and dexamethasone were initiated. The patient was seen by Ethics and was noted to have no surrogates for decision making. The patient was seen by Infectious Disease and tocilizumab was administered for COVID-19 pneumonia and acute hypoxic respiratory failure. The patient was transferred to the Medical service for further management. The patient was continued on supplemental oxygen via high flow nasal oxygen for hypoxia. The patient had improvement in the hypoxia and supplemental oxygen was titrated. The patient was evaluated and thought to be appropriate for outpatient physical therapy upon discharge from the hospital. Local wound care was continued for the right foot ulcers. The patient had continued improvement in his symptoms. The CT results were discussed with the patient with recommendations for further follow up and repeat imaging to follow up after acute infectious issues have resolved. The patient was discharged home with instructions to follow up with his primary care physician for further management.

## 2022-03-24 NOTE — ED ADULT TRIAGE NOTE - CHIEF COMPLAINT QUOTE
Pt. BIBA in cardiac arrest after falling out of a 2 story window. CPR in progress. Pt. brought to trauma room. Trauma A and code blue called.

## 2022-03-24 NOTE — H&P ADULT - NSHPPHYSICALEXAM_GEN_ALL_CORE
Unconscious  Head NCAT, Rj airway in place  Chest atraumatic  abdomen atraumatic  pelvis atraumatic, stable  Ext: No open or closed deformities

## 2022-03-24 NOTE — ED PROVIDER NOTE - CLINICAL SUMMARY MEDICAL DECISION MAKING FREE TEXT BOX
Patient arrives mid cpr, unknown downtime, asystole time approx. 40 minutes, no signs of life on arrival. time of death 23:08, 03/24/2022. Patient arrives mid cpr, unknown downtime, CPR approx. 40 minutes, asystole throughout, no signs of life on arrival. time of death 23:08, 03/24/2022.

## 2022-03-24 NOTE — H&P ADULT - ASSESSMENT
60M unwitnessed fall from height, pulseless ACLS en route 40min prehospital, pronounced at 2308 without signs of life, no cardiac activity on POCUS, FAST negative 60M unwitnessed fall from height, pulseless ACLS en route 40min prehospital, pronounced at 2308 without signs of life, pupils fixed and dilated 4mm without spontaneous respirations and no cardiac activity on POCUS, FAST negative  ME notified by ED

## 2022-03-24 NOTE — H&P ADULT - ATTENDING COMMENTS
I have seen and examined the patient.  Trauma team activation A    found down, unknown time down, next to a second story window that was opened with broken screen, allegedly fall from it.  Patient on asystole since EMS arrival  [place] airway placed CPR for > 35 minutes    On arrival;  No signs of life, pupils fixed non reactive 6mm, asystole on monitor  Declared dead on arrival at 2308hrs   ME contacted by ED

## 2022-03-24 NOTE — ED PROVIDER NOTE - OBJECTIVE STATEMENT
name: Salvatore Alexander. : 1965  Patient bibems from outpatient [facility]. EMS report Patient suspected to have fallen out of the window from 2nd floor, unwitnessed, found unresponsive, down time unknown, cpr started en route at 22:34 for approx. 40 minutes PTA. code blue upgraded to code trauma A upon arrival. name: Salvatore Alexander. : 1965  Patient bibems from outpatient [facility]. EMS report Patient suspected to have fallen out of the window from 2nd floor, unwitnessed, found unresponsive, down time unknown, cpr started en route at 22:34 for approx. 40 minutes PTA. Asystole throughout. Rj Airway placed by EMS. code blue upgraded to code trauma A upon arrival.

## 2022-03-24 NOTE — ED PROVIDER NOTE - PHYSICAL EXAMINATION
Gen: unresponsive, intubated, cpr in progress  Head: NC/AT, intubated, pupils fixed 4mm  Neck: trachea midline  Card: cpr in progress  Resp:  intubated with norris airway, no spontaneous breath sounds  Abd: soft, non-distended  Ext: no deformities  Neuro:  unresponsive  Skin:  cold

## 2022-03-25 LAB
RAPID RVP RESULT: SIGNIFICANT CHANGE UP
SARS-COV-2 RNA SPEC QL NAA+PROBE: SIGNIFICANT CHANGE UP

## 2022-12-02 NOTE — ED ADULT NURSE REASSESSMENT NOTE - NS ED NURSE REASSESS COMMENT FT1
assumed care  of pt, Pt currently on precedex drip, restraints in place. pt on neb treatment with sat 88%, placed on venti mask 50% no improvement, now on NRB o2 sat 93%. MICU provider notified. 78
